# Patient Record
Sex: FEMALE | Race: WHITE | HISPANIC OR LATINO
[De-identification: names, ages, dates, MRNs, and addresses within clinical notes are randomized per-mention and may not be internally consistent; named-entity substitution may affect disease eponyms.]

---

## 2022-07-28 ENCOUNTER — RESULT REVIEW (OUTPATIENT)
Age: 62
End: 2022-07-28

## 2022-08-31 ENCOUNTER — APPOINTMENT (OUTPATIENT)
Dept: CT IMAGING | Facility: CLINIC | Age: 62
End: 2022-08-31

## 2022-08-31 ENCOUNTER — OUTPATIENT (OUTPATIENT)
Dept: OUTPATIENT SERVICES | Facility: HOSPITAL | Age: 62
LOS: 1 days | End: 2022-08-31
Payer: COMMERCIAL

## 2022-08-31 DIAGNOSIS — Z00.8 ENCOUNTER FOR OTHER GENERAL EXAMINATION: ICD-10-CM

## 2022-08-31 PROCEDURE — 70486 CT MAXILLOFACIAL W/O DYE: CPT | Mod: 26

## 2022-08-31 PROCEDURE — 73706 CT ANGIO LWR EXTR W/O&W/DYE: CPT

## 2022-08-31 PROCEDURE — 73706 CT ANGIO LWR EXTR W/O&W/DYE: CPT | Mod: 26,RT

## 2022-08-31 PROCEDURE — 70486 CT MAXILLOFACIAL W/O DYE: CPT

## 2022-08-31 PROCEDURE — 76706 US ABDL AORTA SCREEN AAA: CPT | Mod: 26,LT

## 2022-09-27 ENCOUNTER — APPOINTMENT (OUTPATIENT)
Dept: OTOLARYNGOLOGY | Facility: CLINIC | Age: 62
End: 2022-09-27

## 2022-09-27 VITALS
BODY MASS INDEX: 23.95 KG/M2 | OXYGEN SATURATION: 98 % | HEART RATE: 65 BPM | DIASTOLIC BLOOD PRESSURE: 86 MMHG | WEIGHT: 122 LBS | SYSTOLIC BLOOD PRESSURE: 135 MMHG | TEMPERATURE: 98 F | HEIGHT: 60 IN

## 2022-09-27 DIAGNOSIS — Z86.39 PERSONAL HISTORY OF OTHER ENDOCRINE, NUTRITIONAL AND METABOLIC DISEASE: ICD-10-CM

## 2022-09-27 DIAGNOSIS — Z78.9 OTHER SPECIFIED HEALTH STATUS: ICD-10-CM

## 2022-09-27 PROCEDURE — 99204 OFFICE O/P NEW MOD 45 MIN: CPT

## 2022-09-27 NOTE — PHYSICAL EXAM
[Midline] : trachea located in midline position [de-identified] : No palpable adenopathy [de-identified] : Good mouth opening and reasonable occlusion [de-identified] : Left-sided swelling in the mandible as noted in the HPI consistent with an expansile lesion of the mandible in the body of the ramus involving the posterior molar dentition consistent with the known diagnosis of ameloblastoma no loose dentition noted [Normal] : orientation to person, place, and time: normal [FreeTextEntry2] : Left mandibular asymmetry as noted in the HPI [de-identified] : No previous surgeries or skin changes or trauma to the lower extremities [de-identified] : Normal cranial nerve III exam bilaterally

## 2022-09-27 NOTE — DATA REVIEWED
[de-identified] : CT Head 8/31/22\par IMPRESSION:\par \par 3.7 cm expansile soft tissue lesion arising from the left mandibular angle with cortical thinning and dehiscence corresponding with history of ameloblastoma.\par -\par CT Lower Extremities  8/31/22\par IMPRESSION:\par \par Three-vessel runoff right lower extremity.\par 2 vessel runoff left lower extremity with anterior tibial occlusion in the hindfoot

## 2022-09-27 NOTE — HISTORY OF PRESENT ILLNESS
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  Patient was referred by Dr. Ortiz for planned fibular reconstruction of the left mandible with dental implant placement.  Dr. Ortiz plans a composite resection of the left mandible. [FreeTextEntry1] : Patient has mild swelling in the left face no numbness although the occasional sharp tingling shooting pain in the left side but otherwise has intact cranial nerve exam she does note swelling which is externally visible on the left body and ramus of the mandible

## 2022-12-19 ENCOUNTER — APPOINTMENT (OUTPATIENT)
Dept: OTOLARYNGOLOGY | Facility: HOSPITAL | Age: 62
End: 2022-12-19

## 2023-01-05 ENCOUNTER — NON-APPOINTMENT (OUTPATIENT)
Age: 63
End: 2023-01-05

## 2023-01-05 ENCOUNTER — APPOINTMENT (OUTPATIENT)
Dept: INTERNAL MEDICINE | Facility: CLINIC | Age: 63
End: 2023-01-05
Payer: COMMERCIAL

## 2023-01-05 VITALS
OXYGEN SATURATION: 97 % | WEIGHT: 118 LBS | TEMPERATURE: 99.3 F | DIASTOLIC BLOOD PRESSURE: 88 MMHG | SYSTOLIC BLOOD PRESSURE: 118 MMHG | BODY MASS INDEX: 23.05 KG/M2 | HEART RATE: 70 BPM

## 2023-01-05 DIAGNOSIS — Z81.8 FAMILY HISTORY OF OTHER MENTAL AND BEHAVIORAL DISORDERS: ICD-10-CM

## 2023-01-05 DIAGNOSIS — Z00.00 ENCOUNTER FOR GENERAL ADULT MEDICAL EXAMINATION W/OUT ABNORMAL FINDINGS: ICD-10-CM

## 2023-01-05 DIAGNOSIS — Z83.42 FAMILY HISTORY OF FAMILIAL HYPERCHOLESTEROLEMIA: ICD-10-CM

## 2023-01-05 DIAGNOSIS — Z82.49 FAMILY HISTORY OF ISCHEMIC HEART DISEASE AND OTHER DISEASES OF THE CIRCULATORY SYSTEM: ICD-10-CM

## 2023-01-05 DIAGNOSIS — Z23 ENCOUNTER FOR IMMUNIZATION: ICD-10-CM

## 2023-01-05 DIAGNOSIS — J32.9 CHRONIC SINUSITIS, UNSPECIFIED: ICD-10-CM

## 2023-01-05 PROCEDURE — 90686 IIV4 VACC NO PRSV 0.5 ML IM: CPT

## 2023-01-05 PROCEDURE — G0009: CPT | Mod: 59

## 2023-01-05 PROCEDURE — 90472 IMMUNIZATION ADMIN EACH ADD: CPT

## 2023-01-05 PROCEDURE — 90732 PPSV23 VACC 2 YRS+ SUBQ/IM: CPT

## 2023-01-05 PROCEDURE — 36415 COLL VENOUS BLD VENIPUNCTURE: CPT

## 2023-01-05 PROCEDURE — 93000 ELECTROCARDIOGRAM COMPLETE: CPT

## 2023-01-05 PROCEDURE — 99386 PREV VISIT NEW AGE 40-64: CPT | Mod: 25

## 2023-01-06 LAB
24R-OH-CALCIDIOL SERPL-MCNC: 56 PG/ML
ALBUMIN SERPL ELPH-MCNC: 4.7 G/DL
ALP BLD-CCNC: 47 U/L
ALT SERPL-CCNC: 21 U/L
ANION GAP SERPL CALC-SCNC: 12 MMOL/L
APPEARANCE: CLEAR
AST SERPL-CCNC: 20 U/L
BASOPHILS # BLD AUTO: 0.04 K/UL
BASOPHILS NFR BLD AUTO: 0.9 %
BILIRUB SERPL-MCNC: 0.7 MG/DL
BILIRUBIN URINE: NEGATIVE
BLOOD URINE: NEGATIVE
BUN SERPL-MCNC: 16 MG/DL
CALCIUM SERPL-MCNC: 9.8 MG/DL
CHLORIDE SERPL-SCNC: 107 MMOL/L
CHOLEST SERPL-MCNC: 242 MG/DL
CO2 SERPL-SCNC: 27 MMOL/L
COLOR: YELLOW
CREAT SERPL-MCNC: 0.76 MG/DL
EGFR: 90 ML/MIN/1.73M2
EOSINOPHIL # BLD AUTO: 0.07 K/UL
EOSINOPHIL NFR BLD AUTO: 1.5 %
ESTIMATED AVERAGE GLUCOSE: 123 MG/DL
GLUCOSE QUALITATIVE U: NEGATIVE
GLUCOSE SERPL-MCNC: 91 MG/DL
HBA1C MFR BLD HPLC: 5.9 %
HCT VFR BLD CALC: 38.1 %
HDLC SERPL-MCNC: 52 MG/DL
HGB BLD-MCNC: 12 G/DL
IMM GRANULOCYTES NFR BLD AUTO: 0.2 %
INR PPP: 1.03 RATIO
KETONES URINE: ABNORMAL
LDLC SERPL CALC-MCNC: 176 MG/DL
LEUKOCYTE ESTERASE URINE: NEGATIVE
LYMPHOCYTES # BLD AUTO: 2.14 K/UL
LYMPHOCYTES NFR BLD AUTO: 45.8 %
MAN DIFF?: NORMAL
MCHC RBC-ENTMCNC: 29.1 PG
MCHC RBC-ENTMCNC: 31.5 GM/DL
MCV RBC AUTO: 92.3 FL
MONOCYTES # BLD AUTO: 0.45 K/UL
MONOCYTES NFR BLD AUTO: 9.6 %
NEUTROPHILS # BLD AUTO: 1.96 K/UL
NEUTROPHILS NFR BLD AUTO: 42 %
NITRITE URINE: NEGATIVE
NONHDLC SERPL-MCNC: 190 MG/DL
PH URINE: 6
PLATELET # BLD AUTO: 389 K/UL
POTASSIUM SERPL-SCNC: 4.1 MMOL/L
PROT SERPL-MCNC: 7.2 G/DL
PROTEIN URINE: NORMAL
PT BLD: 12 SEC
RBC # BLD: 4.13 M/UL
RBC # FLD: 13.6 %
SODIUM SERPL-SCNC: 146 MMOL/L
SPECIFIC GRAVITY URINE: 1.03
TRIGL SERPL-MCNC: 72 MG/DL
TSH SERPL-ACNC: 0.57 UIU/ML
UROBILINOGEN URINE: NORMAL
WBC # FLD AUTO: 4.67 K/UL

## 2023-01-06 NOTE — ADDENDUM
[FreeTextEntry1] : EKG normal sinus rhythm, no ectopy–within normal limits,\par \par Blood work reviewed, normal values–within normal limits\par \par Physical examination within normal limits,\par \par Patient medically cleared for the surgery, no medical contraindications.  If any questions please feel free to contact [phone].\par

## 2023-01-06 NOTE — ASSESSMENT
[As per surgery] : as per surgery [FreeTextEntry5] : Not on any anticoagulants [FreeTextEntry6] : Not on any [FreeTextEntry7] : no medications

## 2023-01-06 NOTE — HISTORY OF PRESENT ILLNESS
[No Pertinent Cardiac History] : no history of aortic stenosis, atrial fibrillation, coronary artery disease, recent myocardial infarction, or implantable device/pacemaker [No Pertinent Pulmonary History] : no history of asthma, COPD, sleep apnea, or smoking [No Adverse Anesthesia Reaction] : no adverse anesthesia reaction in self or family member [(Patient denies any chest pain, claudication, dyspnea on exertion, orthopnea, palpitations or syncope)] : Patient denies any chest pain, claudication, dyspnea on exertion, orthopnea, palpitations or syncope [Anti-Platelet Agents: _____] : Anti-Platelet Agents: [unfilled] [Other: _____] : [unfilled] [FreeTextEntry1] : Segmental, Mandibulectomy, Nect dissection, Tracheostomy, fibular graft [FreeTextEntry2] : 01/11/23 [FreeTextEntry3] : Dr Randy Ortiz [FreeTextEntry4] : Patient here for medical clearance.62 year old female patient presents for left jaw surgery for amyloblastoma reccurent.offers no complaints\par  Pt feels better at the time of exam, no fever , shaking chills no nausea, no vomiting, no diarrhoea, no constipation, no chest pain, no palpitations.Denies any cough or any other respiratory s/s.Not loosing lot of weight nor gaining excess amount of weight.No muscle aches, no joint pains, no skin rashes.No forgetfulness no difficulty with ADLs, Able to sleep, eat ,function and eliminate well.Not been to an emergency room or hospital in the last one year. vaccinated for covid with both vaccinationsjhonson and juma inetial with booster.offers no complaints.H/o covid infection dec2021.\par

## 2023-01-06 NOTE — PLAN
[FreeTextEntry1] : EKG normal sinus rhythm, no ectopy–within normal limits,\par \par Blood work drawn\par Physical examination within normal limits,\par \par Patient medically cleared for the surgery, no medical contraindications.  If any questions please feel free to contact [phone].\par

## 2023-01-10 ENCOUNTER — TRANSCRIPTION ENCOUNTER (OUTPATIENT)
Age: 63
End: 2023-01-10

## 2023-01-10 NOTE — PATIENT PROFILE ADULT - FALL HARM RISK - UNIVERSAL INTERVENTIONS
Bed in lowest position, wheels locked, appropriate side rails in place/Call bell, personal items and telephone in reach/Instruct patient to call for assistance before getting out of bed or chair/Non-slip footwear when patient is out of bed/[place] to call system/Physically safe environment - no spills, clutter or unnecessary equipment/Purposeful Proactive Rounding/Room/bathroom lighting operational, light cord in reach

## 2023-01-11 ENCOUNTER — TRANSCRIPTION ENCOUNTER (OUTPATIENT)
Age: 63
End: 2023-01-11

## 2023-01-11 ENCOUNTER — INPATIENT (INPATIENT)
Facility: HOSPITAL | Age: 63
LOS: 6 days | Discharge: HOME | DRG: 141 | End: 2023-01-18
Attending: DENTIST | Admitting: DENTIST
Payer: COMMERCIAL

## 2023-01-11 ENCOUNTER — APPOINTMENT (OUTPATIENT)
Dept: OTOLARYNGOLOGY | Facility: HOSPITAL | Age: 63
End: 2023-01-11

## 2023-01-11 VITALS
SYSTOLIC BLOOD PRESSURE: 169 MMHG | WEIGHT: 119.27 LBS | RESPIRATION RATE: 16 BRPM | OXYGEN SATURATION: 100 % | DIASTOLIC BLOOD PRESSURE: 90 MMHG | HEART RATE: 101 BPM | HEIGHT: 60 IN | TEMPERATURE: 98 F

## 2023-01-11 DIAGNOSIS — K06.9 DISORDER OF GINGIVA AND EDENTULOUS ALVEOLAR RIDGE, UNSPECIFIED: Chronic | ICD-10-CM

## 2023-01-11 DIAGNOSIS — Z87.42 PERSONAL HISTORY OF OTHER DISEASES OF THE FEMALE GENITAL TRACT: Chronic | ICD-10-CM

## 2023-01-11 LAB
ANION GAP SERPL CALC-SCNC: 8 MMOL/L — SIGNIFICANT CHANGE UP (ref 5–17)
BUN SERPL-MCNC: 9 MG/DL — SIGNIFICANT CHANGE UP (ref 7–23)
CALCIUM SERPL-MCNC: 8.2 MG/DL — LOW (ref 8.4–10.5)
CHLORIDE SERPL-SCNC: 104 MMOL/L — SIGNIFICANT CHANGE UP (ref 96–108)
CO2 SERPL-SCNC: 25 MMOL/L — SIGNIFICANT CHANGE UP (ref 22–31)
CREAT SERPL-MCNC: 0.65 MG/DL — SIGNIFICANT CHANGE UP (ref 0.5–1.3)
EGFR: 101 ML/MIN/1.73M2 — SIGNIFICANT CHANGE UP
GLUCOSE BLDC GLUCOMTR-MCNC: 157 MG/DL — HIGH (ref 70–99)
GLUCOSE SERPL-MCNC: 202 MG/DL — HIGH (ref 70–99)
HCT VFR BLD CALC: 29.8 % — LOW (ref 34.5–45)
HGB BLD-MCNC: 9.7 G/DL — LOW (ref 11.5–15.5)
MAGNESIUM SERPL-MCNC: 1.5 MG/DL — LOW (ref 1.6–2.6)
MCHC RBC-ENTMCNC: 29.4 PG — SIGNIFICANT CHANGE UP (ref 27–34)
MCHC RBC-ENTMCNC: 32.6 GM/DL — SIGNIFICANT CHANGE UP (ref 32–36)
MCV RBC AUTO: 90.3 FL — SIGNIFICANT CHANGE UP (ref 80–100)
NRBC # BLD: 0 /100 WBCS — SIGNIFICANT CHANGE UP (ref 0–0)
PHOSPHATE SERPL-MCNC: 3.1 MG/DL — SIGNIFICANT CHANGE UP (ref 2.5–4.5)
PLATELET # BLD AUTO: 314 K/UL — SIGNIFICANT CHANGE UP (ref 150–400)
POTASSIUM SERPL-MCNC: 3.8 MMOL/L — SIGNIFICANT CHANGE UP (ref 3.5–5.3)
POTASSIUM SERPL-SCNC: 3.8 MMOL/L — SIGNIFICANT CHANGE UP (ref 3.5–5.3)
RBC # BLD: 3.3 M/UL — LOW (ref 3.8–5.2)
RBC # FLD: 13.2 % — SIGNIFICANT CHANGE UP (ref 10.3–14.5)
SODIUM SERPL-SCNC: 137 MMOL/L — SIGNIFICANT CHANGE UP (ref 135–145)
TSH SERPL-MCNC: 0.57 UIU/ML — SIGNIFICANT CHANGE UP (ref 0.27–4.2)
WBC # BLD: 8.09 K/UL — SIGNIFICANT CHANGE UP (ref 3.8–10.5)
WBC # FLD AUTO: 8.09 K/UL — SIGNIFICANT CHANGE UP (ref 3.8–10.5)

## 2023-01-11 PROCEDURE — 88309 TISSUE EXAM BY PATHOLOGIST: CPT | Mod: 26

## 2023-01-11 PROCEDURE — 20955 FIBULA BONE GRAFT MICROVASC: CPT

## 2023-01-11 PROCEDURE — 99221 1ST HOSP IP/OBS SF/LOW 40: CPT

## 2023-01-11 PROCEDURE — 21244 RECONSTRUCTION OF LOWER JAW: CPT | Mod: 62

## 2023-01-11 PROCEDURE — 88311 DECALCIFY TISSUE: CPT | Mod: 26

## 2023-01-11 PROCEDURE — 71045 X-RAY EXAM CHEST 1 VIEW: CPT | Mod: 26

## 2023-01-11 DEVICE — LIGATING CLIPS SYNOVIS SUPERFINE MICROCLIP 6: Type: IMPLANTABLE DEVICE | Status: FUNCTIONAL

## 2023-01-11 DEVICE — DOPPLER PROBE DISPOSABLE: Type: IMPLANTABLE DEVICE | Status: FUNCTIONAL

## 2023-01-11 DEVICE — SCREW BONE 2.0 X 8MM CROSS PIN: Type: IMPLANTABLE DEVICE | Status: FUNCTIONAL

## 2023-01-11 DEVICE — CLIP APPLIER ETHICON LIGACLIP 9 3/8" SMALL: Type: IMPLANTABLE DEVICE | Status: FUNCTIONAL

## 2023-01-11 DEVICE — IMP SCREW RET ABUT ST GH 4.6X2.5MM TAN: Type: IMPLANTABLE DEVICE | Status: FUNCTIONAL

## 2023-01-11 DEVICE — IMPLANTABLE DEVICE: Type: IMPLANTABLE DEVICE | Status: FUNCTIONAL

## 2023-01-11 DEVICE — SCREW LOKG 2.0X8MM MUST ORDER IN MULT OF 5 EA: Type: IMPLANTABLE DEVICE | Status: FUNCTIONAL

## 2023-01-11 DEVICE — PLATE CUSTOM MAND RECON HEMI: Type: IMPLANTABLE DEVICE | Status: FUNCTIONAL

## 2023-01-11 DEVICE — SCREW 2.0 X 12MM BONE CROSS PIN: Type: IMPLANTABLE DEVICE | Status: FUNCTIONAL

## 2023-01-11 DEVICE — SCREW BONE 2.0 X 6MM: Type: IMPLANTABLE DEVICE | Status: FUNCTIONAL

## 2023-01-11 DEVICE — CLIP APPLIER ETHICON LIGACLIP 11.5" MEDIUM: Type: IMPLANTABLE DEVICE | Status: FUNCTIONAL

## 2023-01-11 DEVICE — SCREW MMF LOCKING 2.0X12MM: Type: IMPLANTABLE DEVICE | Status: FUNCTIONAL

## 2023-01-11 DEVICE — SCREW LOKG 2.0X6MM: Type: IMPLANTABLE DEVICE | Status: FUNCTIONAL

## 2023-01-11 DEVICE — SCREW SLF DRILLING 8MM MMF: Type: IMPLANTABLE DEVICE | Status: FUNCTIONAL

## 2023-01-11 DEVICE — GRAFT NERVE PROTECTOR 5X40MM: Type: IMPLANTABLE DEVICE | Status: FUNCTIONAL

## 2023-01-11 DEVICE — BUNDLE VSP RECON GUIDES TI: Type: IMPLANTABLE DEVICE | Status: FUNCTIONAL

## 2023-01-11 DEVICE — SURGIFOAM PAD 8CM X 12.5CM X 10MM (100): Type: IMPLANTABLE DEVICE | Status: FUNCTIONAL

## 2023-01-11 DEVICE — CARTRIDGE MICROCLIP 30: Type: IMPLANTABLE DEVICE | Status: FUNCTIONAL

## 2023-01-11 DEVICE — COUPLER VESSEL ANASTOMOTIC 3MM: Type: IMPLANTABLE DEVICE | Status: FUNCTIONAL

## 2023-01-11 RX ORDER — AMPICILLIN SODIUM AND SULBACTAM SODIUM 250; 125 MG/ML; MG/ML
3 INJECTION, POWDER, FOR SUSPENSION INTRAMUSCULAR; INTRAVENOUS EVERY 6 HOURS
Refills: 0 | Status: COMPLETED | OUTPATIENT
Start: 2023-01-11 | End: 2023-01-12

## 2023-01-11 RX ORDER — OXYCODONE HYDROCHLORIDE 5 MG/1
5 TABLET ORAL
Refills: 0 | Status: DISCONTINUED | OUTPATIENT
Start: 2023-01-11 | End: 2023-01-12

## 2023-01-11 RX ORDER — POTASSIUM CHLORIDE 20 MEQ
10 PACKET (EA) ORAL
Refills: 0 | Status: COMPLETED | OUTPATIENT
Start: 2023-01-11 | End: 2023-01-11

## 2023-01-11 RX ORDER — DEXTROSE 50 % IN WATER 50 %
12.5 SYRINGE (ML) INTRAVENOUS ONCE
Refills: 0 | Status: DISCONTINUED | OUTPATIENT
Start: 2023-01-11 | End: 2023-01-18

## 2023-01-11 RX ORDER — DEXTROSE 50 % IN WATER 50 %
15 SYRINGE (ML) INTRAVENOUS ONCE
Refills: 0 | Status: DISCONTINUED | OUTPATIENT
Start: 2023-01-11 | End: 2023-01-18

## 2023-01-11 RX ORDER — INSULIN LISPRO 100/ML
VIAL (ML) SUBCUTANEOUS EVERY 6 HOURS
Refills: 0 | Status: DISCONTINUED | OUTPATIENT
Start: 2023-01-11 | End: 2023-01-18

## 2023-01-11 RX ORDER — DEXTROSE 50 % IN WATER 50 %
25 SYRINGE (ML) INTRAVENOUS ONCE
Refills: 0 | Status: DISCONTINUED | OUTPATIENT
Start: 2023-01-11 | End: 2023-01-18

## 2023-01-11 RX ORDER — CHLORHEXIDINE GLUCONATE 213 G/1000ML
15 SOLUTION TOPICAL
Refills: 0 | Status: DISCONTINUED | OUTPATIENT
Start: 2023-01-11 | End: 2023-01-18

## 2023-01-11 RX ORDER — HYDROMORPHONE HYDROCHLORIDE 2 MG/ML
0.25 INJECTION INTRAMUSCULAR; INTRAVENOUS; SUBCUTANEOUS EVERY 4 HOURS
Refills: 0 | Status: DISCONTINUED | OUTPATIENT
Start: 2023-01-11 | End: 2023-01-11

## 2023-01-11 RX ORDER — SODIUM CHLORIDE 9 MG/ML
1000 INJECTION, SOLUTION INTRAVENOUS
Refills: 0 | Status: DISCONTINUED | OUTPATIENT
Start: 2023-01-11 | End: 2023-01-18

## 2023-01-11 RX ORDER — ACETAMINOPHEN 500 MG
810 TABLET ORAL EVERY 6 HOURS
Refills: 0 | Status: DISCONTINUED | OUTPATIENT
Start: 2023-01-11 | End: 2023-01-18

## 2023-01-11 RX ORDER — PANTOPRAZOLE SODIUM 20 MG/1
40 TABLET, DELAYED RELEASE ORAL DAILY
Refills: 0 | Status: DISCONTINUED | OUTPATIENT
Start: 2023-01-11 | End: 2023-01-18

## 2023-01-11 RX ORDER — SODIUM CHLORIDE 9 MG/ML
1000 INJECTION, SOLUTION INTRAVENOUS
Refills: 0 | Status: DISCONTINUED | OUTPATIENT
Start: 2023-01-11 | End: 2023-01-12

## 2023-01-11 RX ORDER — GABAPENTIN 400 MG/1
300 CAPSULE ORAL
Refills: 0 | Status: DISCONTINUED | OUTPATIENT
Start: 2023-01-11 | End: 2023-01-18

## 2023-01-11 RX ORDER — MAGNESIUM SULFATE 500 MG/ML
2 VIAL (ML) INJECTION ONCE
Refills: 0 | Status: COMPLETED | OUTPATIENT
Start: 2023-01-11 | End: 2023-01-11

## 2023-01-11 RX ORDER — DEXAMETHASONE 0.5 MG/5ML
8 ELIXIR ORAL EVERY 8 HOURS
Refills: 0 | Status: DISCONTINUED | OUTPATIENT
Start: 2023-01-11 | End: 2023-01-12

## 2023-01-11 RX ORDER — POLYETHYLENE GLYCOL 3350 17 G/17G
17 POWDER, FOR SOLUTION ORAL DAILY
Refills: 0 | Status: DISCONTINUED | OUTPATIENT
Start: 2023-01-11 | End: 2023-01-18

## 2023-01-11 RX ORDER — GLUCAGON INJECTION, SOLUTION 0.5 MG/.1ML
1 INJECTION, SOLUTION SUBCUTANEOUS ONCE
Refills: 0 | Status: DISCONTINUED | OUTPATIENT
Start: 2023-01-11 | End: 2023-01-18

## 2023-01-11 RX ORDER — SENNA PLUS 8.6 MG/1
2 TABLET ORAL AT BEDTIME
Refills: 0 | Status: DISCONTINUED | OUTPATIENT
Start: 2023-01-11 | End: 2023-01-18

## 2023-01-11 RX ORDER — METRONIDAZOLE 500 MG
500 TABLET ORAL EVERY 8 HOURS
Refills: 0 | Status: COMPLETED | OUTPATIENT
Start: 2023-01-11 | End: 2023-01-12

## 2023-01-11 RX ORDER — HYDROMORPHONE HYDROCHLORIDE 2 MG/ML
0.5 INJECTION INTRAMUSCULAR; INTRAVENOUS; SUBCUTANEOUS ONCE
Refills: 0 | Status: DISCONTINUED | OUTPATIENT
Start: 2023-01-11 | End: 2023-01-12

## 2023-01-11 RX ADMIN — SENNA PLUS 2 TABLET(S): 8.6 TABLET ORAL at 22:46

## 2023-01-11 RX ADMIN — HYDROMORPHONE HYDROCHLORIDE 0.25 MILLIGRAM(S): 2 INJECTION INTRAMUSCULAR; INTRAVENOUS; SUBCUTANEOUS at 18:00

## 2023-01-11 RX ADMIN — Medication 100 MILLIEQUIVALENT(S): at 22:03

## 2023-01-11 RX ADMIN — Medication 8 MILLIGRAM(S): at 18:32

## 2023-01-11 RX ADMIN — PANTOPRAZOLE SODIUM 40 MILLIGRAM(S): 20 TABLET, DELAYED RELEASE ORAL at 19:08

## 2023-01-11 RX ADMIN — HYDROMORPHONE HYDROCHLORIDE 0.25 MILLIGRAM(S): 2 INJECTION INTRAMUSCULAR; INTRAVENOUS; SUBCUTANEOUS at 17:14

## 2023-01-11 RX ADMIN — Medication 100 MILLIGRAM(S): at 19:15

## 2023-01-11 RX ADMIN — CHLORHEXIDINE GLUCONATE 15 MILLILITER(S): 213 SOLUTION TOPICAL at 23:17

## 2023-01-11 RX ADMIN — Medication 810 MILLIGRAM(S): at 23:14

## 2023-01-11 RX ADMIN — Medication 25 GRAM(S): at 19:07

## 2023-01-11 RX ADMIN — AMPICILLIN SODIUM AND SULBACTAM SODIUM 200 GRAM(S): 250; 125 INJECTION, POWDER, FOR SUSPENSION INTRAMUSCULAR; INTRAVENOUS at 20:56

## 2023-01-11 RX ADMIN — Medication 100 MILLIEQUIVALENT(S): at 20:55

## 2023-01-11 RX ADMIN — SODIUM CHLORIDE 90 MILLILITER(S): 9 INJECTION, SOLUTION INTRAVENOUS at 19:54

## 2023-01-11 NOTE — CONSULT NOTE ADULT - ASSESSMENT
Neuro: Tylenol ATC, Gabapentin 600mg QD, PRN Dilaudid    HEENT: Flap checks q1h, Head neutral, Chlorhexidine PO BID, Bacitracin Ointment. Decadron 8q8 x3  CV: HD stable, Maintain MAP > 60, Low dose Levo gtt if needed  Pulm: on NC  GI: NPO, LR 90, DHT CXR to confirm placement; PPI, Senna, Miralax, PRN Zofran   : [place] [employer] I&Os  ID: Mary-op Abx x24 hrs- Unasyn ( 1/11-) Flagyl(1/11-)   Endo: mISS on decadron 8q8 x3  Heme: Active T&S, Hgb >8   ppx: SCDs, SQL on POD#1  Lines: ZOE Blanca(1/11-)   Wounds: MOSES x in leg   PT/OT: Not Ordered     61 y/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     Neuro: Pain: Tylenol ATC, Gabapentin 600mg, Oxycodone PRN via DHT. PRN Dilaudid IV.   HEENT: Flap checks q1h, Head neutral, Chlorhexidine PO BID, Bacitracin Ointmen.  CV: HD stable, Maintain MAP > 60, Low dose Levo gtt if needed  Pulm: on NC  GI: NPO, LR 90, DHT CXR to confirm placement; PPI, Senna, Miralax, PRN Zofran   : [place] I&Os  ID: Mary-op Abx x24 hrs- Unasyn ( 1/11-) Flagyl(1/11-)   Endo: mISS on decadron 8q8 x3  Heme: Active T&S, Hgb >8   ppx: SCDs, SQL on POD#1  Lines: PIV Rianna(1/11-)   Wounds: MOSES x in leg   PT/OT: Not Ordered

## 2023-01-11 NOTE — H&P ADULT - NSHPPHYSICALEXAM_GEN_ALL_CORE
Constitutional: normal appearance, well groomed, well nourished, and in no acute distress.  Neck: no mass and no adenopathy . No palpable adenopathy. parotid gland, submandibular gland and thyroid glands are normal. temporomandibular joint is normal. Temporomandibular joint: Good mouth opening and reasonable occlusion.  Ear: external ears are normal bilaterally.  Nasal:  external appearance is normal, inferior turbinates and middle turbinates are normal and no abnormal secretions.  Oral Cavity: tongue is normal, teeth are normal, palatine tonsils are normal, lingual tonsils are normal, mucosa is normal and trachea located in midline position. Left-sided swelling in the mandible as noted in the HPI consistent with an expansile lesion of the mandible in the body of the ramus involving the posterior molar dentition consistent with the known diagnosis of ameloblastoma no loose dentition noted.  Head/Face:. Left mandibular asymmetry as noted in the HPI. salivary glands are normal  Eyes: ocular motility and gaze are normal, extraocular movements are normal and no nystagmus.  Cardiovascular: cardiovascular peripheral examination is normal. Examination of Periph. Vascular System: No previous surgeries or skin changes or trauma to the lower extremities.  Lymphatic: palpation of lymph nodes is normal and no neck adenopathy.  Neurological: cranial nerves 2-12 intact and orientation to person, place, and time: normal. Neuro: cranial nerves 2-12: Normal cranial nerve III exam bilaterally.

## 2023-01-11 NOTE — H&P ADULT - NSICDXPASTMEDICALHX_GEN_ALL_CORE_FT
PAST MEDICAL HISTORY:  Ameloblastoma of mandible     GERD (Gastroesophageal Reflux Disease) (ICD9 530.81)

## 2023-01-11 NOTE — CONSULT NOTE ADULT - SUBJECTIVE AND OBJECTIVE BOX
SICU CONSULT NOTE    HPI:  62F presents with recent diagnosis of left posterior mandible ameloblastoma presenting today for Left mandibular composite resection, exploration of left neck for microvascular anastomosis, reconstruction with left Fibula free flap, placement of dental implants, possible split thickness skin graft, possible trach. Patient has mild swelling in the left face no numbness although the occasional sharp tingling shooting pain in the left side but otherwise has intact cranial nerve exam she does note swelling which is externally visible on the left body and ramus of the mandible.    Pmhx: HLD, GERD (11 Jan 2023 07:31)      SICU ADDENDUM:     PAST MEDICAL & SURGICAL HISTORY:  GERD (Gastroesophageal Reflux Disease) (ICD9 530.81)  Ameloblastoma of mandible  H/O: Myomectomy (ICD9 V45.89)  S/P Tonsillectomy (ICD9 V45.89)  History of ovarian cyst b/l  Disorder of gums (removal x 10 years)      REVIEW OF SYSTEMS:   Pertinent positives/negatives noted in HPI.       HOME MEDICATIONS:  Home Medications:      ALLERGIES:  - No Known Drug Allergies  - Nuts (Anaphylaxis)    Intolerances:  None      SOCIAL HISTORY:     FAMILY HISTORY:      Vital Signs Last 24 Hrs  T(C): 36.8 (11 Jan 2023 16:31), Max: 36.8 (11 Jan 2023 16:31)  T(F): 98.2 (11 Jan 2023 16:31), Max: 98.2 (11 Jan 2023 16:31)  HR: 73 (11 Jan 2023 18:30) (63 - 101)  BP: 125/71 (11 Jan 2023 18:30) (118/73 - 169/90)  BP(mean): 93 (11 Jan 2023 18:30) (88 - 94)  RR: 14 (11 Jan 2023 18:30) (10 - 23)  SpO2: 100% (11 Jan 2023 18:30) (100% - 100%)    Parameters below as of 11 Jan 2023 18:30  Patient On (Oxygen Delivery Method): nasal cannula  O2 Flow (L/min): 3      PHYSICAL EXAM:  T(C): 36.8 (01-11-23 @ 16:31), Max: 36.8 (01-11-23 @ 16:31)  HR: 73 (01-11-23 @ 18:30) (63 - 101)  BP: 125/71 (01-11-23 @ 18:30) (118/73 - 169/90)  RR: 14 (01-11-23 @ 18:30) (10 - 23)  SpO2: 100% (01-11-23 @ 18:30) (100% - 100%)    GENERAL: NAD, Resting comfortably in bed, asleep  HEENT: NCAT, MMM, Normal conjunctiva  RESP: Nonlabored breathing, No respiratory distress  CARD: Normal rate, Normal peripheral perfusion  GI: Soft, ND, NT, No guarding, No rebound tenderness  EXTREM: WWP, No edema, No gross deformity of extremities  SKIN: No rashes, no lesions  NEURO: AAOx3, No focal motor or sensory deficits  PSYCH: Affect and characteristics of appearance, verbalizations, and behaviors are appropriate    LABS:                        9.7    8.09  )-----------( 314      ( 11 Jan 2023 16:47 )             29.8     01-11    137  |  104  |  9   ----------------------------<  202<H>  3.8   |  25  |  0.65    Ca    8.2<L>      11 Jan 2023 16:47  Phos  3.1     01-11  Mg     1.5     01-11     SICU CONSULT NOTE    HPI:  62F presents with recent diagnosis of left posterior mandible ameloblastoma presenting today for Left mandibular composite resection, exploration of left neck for microvascular anastomosis, reconstruction with left Fibula free flap, placement of dental implants, possible split thickness skin graft, possible trach. Patient has mild swelling in the left face no numbness although the occasional sharp tingling shooting pain in the left side but otherwise has intact cranial nerve exam she does note swelling which is externally visible on the left body and ramus of the mandible.    Pmhx: HLD, GERD (11 Jan 2023 07:31)    SICU ADDENDUM:   now s/p L segmental mandibulectomy L FFF, L neck exploration, MOSES leg, neck penrose, cook doppler, 2 implants, rubber bands, DHT. , UOP 1100, 3200 LR. OR uncomplicated and transferred to SICU for airway monitoring and flap checks.    PAST MEDICAL & SURGICAL HISTORY:  GERD (Gastroesophageal Reflux Disease) (ICD9 530.81)  Ameloblastoma of mandible  H/O: Myomectomy (ICD9 V45.89)  S/P Tonsillectomy (ICD9 V45.89)  History of ovarian cyst b/l  Disorder of gums (removal x 10 years)    REVIEW OF SYSTEMS:   Pertinent positives/negatives noted in HPI.     HOME MEDICATIONS:  Home Medications:    ALLERGIES:  - No Known Drug Allergies  - Nuts (Anaphylaxis)    Intolerances:  None    SOCIAL HISTORY:     FAMILY HISTORY:    Vital Signs Last 24 Hrs  T(C): 36.8 (11 Jan 2023 16:31), Max: 36.8 (11 Jan 2023 16:31)  T(F): 98.2 (11 Jan 2023 16:31), Max: 98.2 (11 Jan 2023 16:31)  HR: 73 (11 Jan 2023 18:30) (63 - 101)  BP: 125/71 (11 Jan 2023 18:30) (118/73 - 169/90)  BP(mean): 93 (11 Jan 2023 18:30) (88 - 94)  RR: 14 (11 Jan 2023 18:30) (10 - 23)  SpO2: 100% (11 Jan 2023 18:30) (100% - 100%)    Parameters below as of 11 Jan 2023 18:30  Patient On (Oxygen Delivery Method): nasal cannula  O2 Flow (L/min): 3    PHYSICAL EXAM:  T(C): 36.8 (01-11-23 @ 16:31), Max: 36.8 (01-11-23 @ 16:31)  HR: 73 (01-11-23 @ 18:30) (63 - 101)  BP: 125/71 (01-11-23 @ 18:30) (118/73 - 169/90)  RR: 14 (01-11-23 @ 18:30) (10 - 23)  SpO2: 100% (01-11-23 @ 18:30) (100% - 100%)    GENERAL: NAD, AXOx3, follows commands  HEENT: NCAT, MMM, incisions C/D/I, penrose site covered by gauze.   RESP: Nonlabored breathing, No respiratory distress, on NC, lungs CTA b/l  CARD: Normal rate, Normal peripheral perfusion, no murmurs  GI: Soft, ND, NT  EXTREM: WWP, No edema, No gross deformity of extremities, MOSES L thigh SS output, incision CDI  SKIN: No rashes, no lesions  NEURO: No focal motor or sensory deficits    LABS:                        9.7    8.09  )-----------( 314      ( 11 Jan 2023 16:47 )             29.8     01-11    137  |  104  |  9   ----------------------------<  202<H>  3.8   |  25  |  0.65    Ca    8.2<L>      11 Jan 2023 16:47  Phos  3.1     01-11  Mg     1.5     01-11

## 2023-01-11 NOTE — PRE-ANESTHESIA EVALUATION ADULT - NSPROPOSEDPROCEDFT_GEN_ALL_CORE
left segmental mandibulectomy/left fibula free flap graft/insertion splint/insertion endosteal implants

## 2023-01-11 NOTE — H&P ADULT - HISTORY OF PRESENT ILLNESS
62F presents with recent diagnosis of left posterior mandible ameloblastoma presenting today for Left mandibular composite resection, exploration of left neck for microvascular anastomosis, reconstruction with left Fibula free flap, placement of dental implants, possible split thickness skin graft, possible trach. Patient has mild swelling in the left face no numbness although the occasional sharp tingling shooting pain in the left side but otherwise has intact cranial nerve exam she does note swelling which is externally visible on the left body and ramus of the mandible.    Pmhx: HLD, GERD

## 2023-01-11 NOTE — H&P ADULT - NSICDXPASTSURGICALHX_GEN_ALL_CORE_FT
PAST SURGICAL HISTORY:  Disorder of gums removal x 10 years    H/O: Myomectomy (ICD9 V45.89)     History of ovarian cyst b/l    S/P Tonsillectomy (ICD9 V45.89)

## 2023-01-11 NOTE — PRE-ANESTHESIA EVALUATION ADULT - NSANTHASARD_GEN_ALL_CORE
2 O-T Advancement Flap Text: The defect edges were debeveled with a #15 scalpel blade.  Given the location of the defect, shape of the defect and the proximity to free margins an O-T advancement flap was deemed most appropriate.  Using a sterile surgical marker, an appropriate advancement flap was drawn incorporating the defect and placing the expected incisions within the relaxed skin tension lines where possible.    The area thus outlined was incised deep to adipose tissue with a #15 scalpel blade.  The skin margins were undermined to an appropriate distance in all directions utilizing iris scissors.

## 2023-01-11 NOTE — H&P ADULT - ASSESSMENT
62F presents with recent diagnosis of left posterior mandible ameloblastoma presenting today for Left mandibular composite resection, exploration of left neck for microvascular anastomosis, reconstruction with left Fibula free flap, placement of dental implants, possible split thickness skin graft, possible trach.  - Consent signed.   - Post operative SICU admission for HD monitoring and flap checks  - Pain control 62F presents with recent diagnosis of left posterior mandible ameloblastoma presenting today for Left mandibular composite resection, exploration of left neck for microvascular anastomosis, reconstruction with left Fibula free flap, placement of dental implants 1/11.  - Pain meds (Tylenol 975mg q6 hrs), opioids as needed  - decadron 8 q8 x3 doses  - Post op CBC, BMP, TSH, A1C  - Start feeds in AM if NGt in place (check CXR)  - RN Flap checks Q1 x24hrs  - Unasyn  x24h  - Lovenox starting tomorrow  - Dc nieto tomorrow  - Zofran 4mg IV q8 PRN for nausea/vomiting  - Agitation meds as needed to avoid excessive neck movement  - NOTHING around neck (no trach ties or collars), do not cut trach sutures  - Peridex swish and spit BID  - PPI  - Monitor BP, ok for pressors to maintain MAP>60   - Bowel regimen (senna + miralax)

## 2023-01-12 LAB
A1C WITH ESTIMATED AVERAGE GLUCOSE RESULT: 5.6 % — SIGNIFICANT CHANGE UP (ref 4–5.6)
ANION GAP SERPL CALC-SCNC: 5 MMOL/L — SIGNIFICANT CHANGE UP (ref 5–17)
BUN SERPL-MCNC: 7 MG/DL — SIGNIFICANT CHANGE UP (ref 7–23)
CALCIUM SERPL-MCNC: 8.2 MG/DL — LOW (ref 8.4–10.5)
CHLORIDE SERPL-SCNC: 106 MMOL/L — SIGNIFICANT CHANGE UP (ref 96–108)
CO2 SERPL-SCNC: 26 MMOL/L — SIGNIFICANT CHANGE UP (ref 22–31)
CREAT SERPL-MCNC: 0.65 MG/DL — SIGNIFICANT CHANGE UP (ref 0.5–1.3)
EGFR: 101 ML/MIN/1.73M2 — SIGNIFICANT CHANGE UP
ESTIMATED AVERAGE GLUCOSE: 114 MG/DL — SIGNIFICANT CHANGE UP (ref 68–114)
GLUCOSE BLDC GLUCOMTR-MCNC: 154 MG/DL — HIGH (ref 70–99)
GLUCOSE BLDC GLUCOMTR-MCNC: 160 MG/DL — HIGH (ref 70–99)
GLUCOSE BLDC GLUCOMTR-MCNC: 161 MG/DL — HIGH (ref 70–99)
GLUCOSE BLDC GLUCOMTR-MCNC: 161 MG/DL — HIGH (ref 70–99)
GLUCOSE BLDC GLUCOMTR-MCNC: 166 MG/DL — HIGH (ref 70–99)
GLUCOSE SERPL-MCNC: 174 MG/DL — HIGH (ref 70–99)
HCT VFR BLD CALC: 29.2 % — LOW (ref 34.5–45)
HGB BLD-MCNC: 9.6 G/DL — LOW (ref 11.5–15.5)
MAGNESIUM SERPL-MCNC: 2.2 MG/DL — SIGNIFICANT CHANGE UP (ref 1.6–2.6)
MCHC RBC-ENTMCNC: 29.4 PG — SIGNIFICANT CHANGE UP (ref 27–34)
MCHC RBC-ENTMCNC: 32.9 GM/DL — SIGNIFICANT CHANGE UP (ref 32–36)
MCV RBC AUTO: 89.6 FL — SIGNIFICANT CHANGE UP (ref 80–100)
NRBC # BLD: 0 /100 WBCS — SIGNIFICANT CHANGE UP (ref 0–0)
PHOSPHATE SERPL-MCNC: 2.6 MG/DL — SIGNIFICANT CHANGE UP (ref 2.5–4.5)
PLATELET # BLD AUTO: 329 K/UL — SIGNIFICANT CHANGE UP (ref 150–400)
POTASSIUM SERPL-MCNC: 4.1 MMOL/L — SIGNIFICANT CHANGE UP (ref 3.5–5.3)
POTASSIUM SERPL-SCNC: 4.1 MMOL/L — SIGNIFICANT CHANGE UP (ref 3.5–5.3)
RBC # BLD: 3.26 M/UL — LOW (ref 3.8–5.2)
RBC # FLD: 13.4 % — SIGNIFICANT CHANGE UP (ref 10.3–14.5)
SODIUM SERPL-SCNC: 137 MMOL/L — SIGNIFICANT CHANGE UP (ref 135–145)
WBC # BLD: 9.16 K/UL — SIGNIFICANT CHANGE UP (ref 3.8–10.5)
WBC # FLD AUTO: 9.16 K/UL — SIGNIFICANT CHANGE UP (ref 3.8–10.5)

## 2023-01-12 PROCEDURE — 99232 SBSQ HOSP IP/OBS MODERATE 35: CPT | Mod: GC

## 2023-01-12 RX ORDER — BACITRACIN ZINC 500 UNIT/G
1 OINTMENT IN PACKET (EA) TOPICAL EVERY 12 HOURS
Refills: 0 | Status: DISCONTINUED | OUTPATIENT
Start: 2023-01-12 | End: 2023-01-18

## 2023-01-12 RX ORDER — SODIUM,POTASSIUM PHOSPHATES 278-250MG
1 POWDER IN PACKET (EA) ORAL ONCE
Refills: 0 | Status: COMPLETED | OUTPATIENT
Start: 2023-01-12 | End: 2023-01-12

## 2023-01-12 RX ORDER — ENOXAPARIN SODIUM 100 MG/ML
40 INJECTION SUBCUTANEOUS EVERY 24 HOURS
Refills: 0 | Status: DISCONTINUED | OUTPATIENT
Start: 2023-01-12 | End: 2023-01-16

## 2023-01-12 RX ORDER — DEXAMETHASONE 0.5 MG/5ML
8 ELIXIR ORAL ONCE
Refills: 0 | Status: COMPLETED | OUTPATIENT
Start: 2023-01-12 | End: 2023-01-12

## 2023-01-12 RX ADMIN — GABAPENTIN 300 MILLIGRAM(S): 400 CAPSULE ORAL at 18:33

## 2023-01-12 RX ADMIN — CHLORHEXIDINE GLUCONATE 15 MILLILITER(S): 213 SOLUTION TOPICAL at 17:26

## 2023-01-12 RX ADMIN — Medication 100 MILLIGRAM(S): at 11:42

## 2023-01-12 RX ADMIN — Medication 2: at 11:41

## 2023-01-12 RX ADMIN — Medication 810 MILLIGRAM(S): at 19:49

## 2023-01-12 RX ADMIN — GABAPENTIN 300 MILLIGRAM(S): 400 CAPSULE ORAL at 07:39

## 2023-01-12 RX ADMIN — Medication 8 MILLIGRAM(S): at 07:18

## 2023-01-12 RX ADMIN — Medication 810 MILLIGRAM(S): at 10:37

## 2023-01-12 RX ADMIN — Medication 100 MILLIGRAM(S): at 04:43

## 2023-01-12 RX ADMIN — PANTOPRAZOLE SODIUM 40 MILLIGRAM(S): 20 TABLET, DELAYED RELEASE ORAL at 11:41

## 2023-01-12 RX ADMIN — SODIUM CHLORIDE 90 MILLILITER(S): 9 INJECTION, SOLUTION INTRAVENOUS at 14:22

## 2023-01-12 RX ADMIN — AMPICILLIN SODIUM AND SULBACTAM SODIUM 200 GRAM(S): 250; 125 INJECTION, POWDER, FOR SUSPENSION INTRAMUSCULAR; INTRAVENOUS at 07:19

## 2023-01-12 RX ADMIN — Medication 810 MILLIGRAM(S): at 12:19

## 2023-01-12 RX ADMIN — Medication 810 MILLIGRAM(S): at 18:52

## 2023-01-12 RX ADMIN — Medication 101.6 MILLIGRAM(S): at 14:26

## 2023-01-12 RX ADMIN — Medication 810 MILLIGRAM(S): at 05:43

## 2023-01-12 RX ADMIN — Medication 2: at 23:54

## 2023-01-12 RX ADMIN — Medication 1 PACKET(S): at 07:18

## 2023-01-12 RX ADMIN — Medication 1 APPLICATION(S): at 18:53

## 2023-01-12 RX ADMIN — AMPICILLIN SODIUM AND SULBACTAM SODIUM 200 GRAM(S): 250; 125 INJECTION, POWDER, FOR SUSPENSION INTRAMUSCULAR; INTRAVENOUS at 14:22

## 2023-01-12 RX ADMIN — Medication 810 MILLIGRAM(S): at 00:14

## 2023-01-12 RX ADMIN — POLYETHYLENE GLYCOL 3350 17 GRAM(S): 17 POWDER, FOR SOLUTION ORAL at 11:42

## 2023-01-12 RX ADMIN — Medication 810 MILLIGRAM(S): at 04:43

## 2023-01-12 RX ADMIN — Medication 2: at 17:35

## 2023-01-12 RX ADMIN — Medication 2: at 00:22

## 2023-01-12 RX ADMIN — SENNA PLUS 2 TABLET(S): 8.6 TABLET ORAL at 23:24

## 2023-01-12 RX ADMIN — Medication 2: at 06:47

## 2023-01-12 RX ADMIN — AMPICILLIN SODIUM AND SULBACTAM SODIUM 200 GRAM(S): 250; 125 INJECTION, POWDER, FOR SUSPENSION INTRAMUSCULAR; INTRAVENOUS at 01:36

## 2023-01-12 RX ADMIN — CHLORHEXIDINE GLUCONATE 15 MILLILITER(S): 213 SOLUTION TOPICAL at 06:47

## 2023-01-12 RX ADMIN — ENOXAPARIN SODIUM 40 MILLIGRAM(S): 100 INJECTION SUBCUTANEOUS at 16:26

## 2023-01-12 NOTE — DIETITIAN INITIAL EVALUATION ADULT - ENTERAL
Recommend Jevity 1.2 via NGT @ 50ml/hr x24hrs (provides 1200ml TV, 1440kcal, 67gprotein, 968ml free water) +150ml water flushes q4hrs (900ml total). Start at 20ml/hr x24hrs and advance 10ml q4hrs to goal.

## 2023-01-12 NOTE — DIETITIAN INITIAL EVALUATION ADULT - PERTINENT MEDS FT
MEDICATIONS  (STANDING):  ampicillin/sulbactam  IVPB 3 Gram(s) IV Intermittent every 6 hours  bacitracin   Ointment 1 Application(s) Topical every 12 hours  chlorhexidine 0.12% Liquid 15 milliLiter(s) Oral Mucosa two times a day  dexAMETHasone  IVPB 8 milliGRAM(s) IV Intermittent once  dextrose 5%. 1000 milliLiter(s) (100 mL/Hr) IV Continuous <Continuous>  dextrose 5%. 1000 milliLiter(s) (50 mL/Hr) IV Continuous <Continuous>  dextrose 50% Injectable 25 Gram(s) IV Push once  dextrose 50% Injectable 12.5 Gram(s) IV Push once  dextrose 50% Injectable 25 Gram(s) IV Push once  enoxaparin Injectable 40 milliGRAM(s) SubCutaneous every 24 hours  gabapentin Solution 300 milliGRAM(s) Oral two times a day  glucagon  Injectable 1 milliGRAM(s) IntraMuscular once  insulin lispro (ADMELOG) corrective regimen sliding scale   SubCutaneous every 6 hours  lactated ringers. 1000 milliLiter(s) (90 mL/Hr) IV Continuous <Continuous>  pantoprazole  Injectable 40 milliGRAM(s) IV Push daily  polyethylene glycol 3350 17 Gram(s) Oral daily  polymyxin 654679 Unit(s) 355621 Unit(s) Topical once  senna 2 Tablet(s) Oral at bedtime    MEDICATIONS  (PRN):  acetaminophen    Suspension .. 810 milliGRAM(s) Oral every 6 hours PRN Moderate Pain (4 - 6)  dextrose Oral Gel 15 Gram(s) Oral once PRN Blood Glucose LESS THAN 70 milliGRAM(s)/deciliter  HYDROmorphone  Injectable 0.5 milliGRAM(s) IV Push once PRN breakthrough  oxyCODONE    Solution 5 milliGRAM(s) Oral every 3 hours PRN Severe Pain (7 - 10)

## 2023-01-12 NOTE — PROGRESS NOTE ADULT - SUBJECTIVE AND OBJECTIVE BOX
INTERVAL/OVERNIGHT EVENTS: NAEO.     SUBJECTIVE: This AM doing well and pain under control. No CP/SOB. No N/V or abd pain.     POD # 2     Neurologic Medications  acetaminophen    Suspension .. 810 milliGRAM(s) Oral every 6 hours PRN Moderate Pain (4 - 6)  gabapentin Solution 300 milliGRAM(s) Oral two times a day  HYDROmorphone  Injectable 0.5 milliGRAM(s) IV Push once PRN breakthrough  oxyCODONE    Solution 5 milliGRAM(s) Oral every 3 hours PRN Severe Pain (7 - 10)    Gastrointestinal Medications  lactated ringers. 1000 milliLiter(s) IV Continuous <Continuous>  pantoprazole  Injectable 40 milliGRAM(s) IV Push daily  polyethylene glycol 3350 17 Gram(s) Oral daily  senna 2 Tablet(s) Oral at bedtime    Hematologic/Oncologic Medications  enoxaparin Injectable 40 milliGRAM(s) SubCutaneous every 24 hours    Antimicrobial/Immunologic Medications  ampicillin/sulbactam  IVPB 3 Gram(s) IV Intermittent every 6 hours    Endocrine/Metabolic Medications  dexAMETHasone  IVPB 8 milliGRAM(s) IV Intermittent once  glucagon  Injectable 1 milliGRAM(s) IntraMuscular once  insulin lispro (ADMELOG) corrective regimen sliding scale   SubCutaneous every 6 hours    Topical/Other Medications  bacitracin   Ointment 1 Application(s) Topical every 12 hours  chlorhexidine 0.12% Liquid 15 milliLiter(s) Oral Mucosa two times a day  polymyxin 637959 Unit(s) 855130 Unit(s) Topical once    MEDICATIONS  (PRN):  acetaminophen    Suspension .. 810 milliGRAM(s) Oral every 6 hours PRN Moderate Pain (4 - 6)  dextrose Oral Gel 15 Gram(s) Oral once PRN Blood Glucose LESS THAN 70 milliGRAM(s)/deciliter  HYDROmorphone  Injectable 0.5 milliGRAM(s) IV Push once PRN breakthrough  oxyCODONE    Solution 5 milliGRAM(s) Oral every 3 hours PRN Severe Pain (7 - 10)    I&O's Detail    11 Jan 2023 07:01  -  12 Jan 2023 07:00  --------------------------------------------------------  IN:    IV PiggyBack: 100 mL    IV PiggyBack: 50 mL    IV PiggyBack: 200 mL    IV PiggyBack: 200 mL    Lactated Ringers: 1170 mL  Total IN: 1720 mL    OUT:    Bulb (mL): 55 mL    Indwelling Catheter - Urethral (mL): 1670 mL  Total OUT: 1725 mL    Total NET: -5 mL    12 Jan 2023 07:01  -  12 Jan 2023 14:08  --------------------------------------------------------  IN:    Enteral Tube Flush: 120 mL    IV PiggyBack: 100 mL    IV PiggyBack: 100 mL    Lactated Ringers: 540 mL  Total IN: 860 mL    OUT:    Bulb (mL): 20 mL  Total OUT: 20 mL    Total NET: 840 mL    Vital Signs Last 24 Hrs  T(C): 37.2 (12 Jan 2023 12:00), Max: 37.2 (11 Jan 2023 19:35)  T(F): 99 (12 Jan 2023 12:00), Max: 99 (12 Jan 2023 12:00)  HR: 82 (12 Jan 2023 12:00) (63 - 93)  BP: 122/61 (12 Jan 2023 12:00) (106/65 - 130/62)  BP(mean): 86 (12 Jan 2023 12:00) (65 - 94)  RR: 21 (12 Jan 2023 12:00) (10 - 23)  SpO2: 97% (12 Jan 2023 12:00) (96% - 100%)      GENERAL: NAD, AXOx3, follows commands  HEENT: NCAT, MMM, incisions C/D/I, penrose site without hemotoma/swelling.  RESP: Nonlabored breathing, No respiratory distress, on NC, lungs CTA b/l  CARD: Normal rate, Normal peripheral perfusion, no murmurs  GI: Soft, ND, NT  EXTREM: WWP, No edema, No gross deformity of extremities, MOSES L thigh SS output, incision CDI  SKIN: No rashes, no lesions  NEURO: No focal motor or sensory deficits    LABS:                        9.6    9.16  )-----------( 329      ( 12 Jan 2023 05:32 )             29.2     01-12    137  |  106  |  7   ----------------------------<  174<H>  4.1   |  26  |  0.65    Ca    8.2<L>      12 Jan 2023 05:32  Phos  2.6     01-12  Mg     2.2     01-12

## 2023-01-12 NOTE — PROGRESS NOTE ADULT - ASSESSMENT
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  PLAN:  - Cam boot today        POD 1:      Consult nutrition, start tube feeds and advance to continuous goal.     Paris catheter removal and trial of void if extubated     RN Flap checks Q1 for 24 hours      OOB to chair      Discontinue ABX      Steroids completed     Continue Ondansetron PRN nausea/vomiting , PO Senna once daily, Miralax 17g once daily,  Chlorhexidine swish and spit, Esomeprazole, Enoxaparin, Gabapentin, Acetaminophen     Continue incentive spirometry and SCD’s          Stacy Motley PA-C  01-12-23 @ 10:26

## 2023-01-12 NOTE — DIETITIAN INITIAL EVALUATION ADULT - NS FNS DIET ORDER
Diet, NPO with Tube Feed:   Tube Feeding Modality: Nasogastric  Jevity 1.2 Moshe (JEVITY1.2RTH)  Total Volume for 24 Hours (mL): 1080  Total Number of Cans: 5  Continuous  Starting Tube Feed Rate {mL per Hour}: 20  Increase Tube Feed Rate by (mL): 10     Every 4 hours  Until Goal Tube Feed Rate (mL per Hour): 45  Tube Feed Duration (in Hours): 24  Tube Feed Start Time: 13:30  Liquid Protein Supplement     Qty per Day:  1 (01-12-23 @ 13:04)

## 2023-01-12 NOTE — PROGRESS NOTE ADULT - SUBJECTIVE AND OBJECTIVE BOX
OTOLARYNGOLOGY (ENT) PROGRESS NOTE    PATIENT: WALI HOFFMANN  MRN: [id number]  : 64  [id number]  DATE OF SERVICE:  23  			          Subjective/ Interval:   : patient POD 1, intraoral incisions intact, neck is soft and flat, SS drainage from [place], cook signal strong, elastics in place   ALLERGIES:  No Known Drug Allergies  Nuts (Anaphylaxis)      MEDICATIONS:  Antiinfectives:   ampicillin/sulbactam  IVPB 3 Gram(s) IV Intermittent every 6 hours  metroNIDAZOLE  IVPB 500 milliGRAM(s) IV Intermittent every 8 hours    IV fluids:  dextrose 5%. 1000 milliLiter(s) IV Continuous <Continuous>  dextrose 5%. 1000 milliLiter(s) IV Continuous <Continuous>  lactated ringers. 1000 milliLiter(s) IV Continuous <Continuous>    Hematologic/Anticoagulation:  enoxaparin Injectable 40 milliGRAM(s) SubCutaneous every 24 hours    Pain medications/Neuro:  acetaminophen    Suspension .. 810 milliGRAM(s) Oral every 6 hours PRN  gabapentin Solution 300 milliGRAM(s) Oral two times a day  HYDROmorphone  Injectable 0.5 milliGRAM(s) IV Push once PRN  oxyCODONE    Solution 5 milliGRAM(s) Oral every 3 hours PRN    Endocrine Medications:   dexAMETHasone  IVPB 8 milliGRAM(s) IV Intermittent once  dextrose 50% Injectable 25 Gram(s) IV Push once  dextrose 50% Injectable 12.5 Gram(s) IV Push once  dextrose 50% Injectable 25 Gram(s) IV Push once  dextrose Oral Gel 15 Gram(s) Oral once PRN  glucagon  Injectable 1 milliGRAM(s) IntraMuscular once  insulin lispro (ADMELOG) corrective regimen sliding scale   SubCutaneous every 6 hours    All other standing medications:   bacitracin   Ointment 1 Application(s) Topical every 12 hours  chlorhexidine 0.12% Liquid 15 milliLiter(s) Oral Mucosa two times a day  pantoprazole  Injectable 40 milliGRAM(s) IV Push daily  polyethylene glycol 3350 17 Gram(s) Oral daily  polymyxin 132383 Unit(s) 277948 Unit(s) Topical once  senna 2 Tablet(s) Oral at bedtime    All other PRN medications:    Vital Signs Last 24 Hrs  T(C): 36.9 (2023 09:44), Max: 37.2 (2023 19:35)  T(F): 98.4 (2023 09:44), Max: 98.9 (2023 19:35)  HR: 82 (2023 09:44) (63 - 93)  BP: 116/58 (2023 08:00) (116/58 - 130/62)  BP(mean): 82 (2023 08:00) (82 - 94)  RR: 17 (2023 09:44) (10 - 23)  SpO2: 97% (2023 09:44) (96% - 100%)    Parameters below as of 2023 07:00  Patient On (Oxygen Delivery Method): room air           @ 07:  -   @ 07:00  --------------------------------------------------------  IN:    IV PiggyBack: 100 mL    IV PiggyBack: 50 mL    IV PiggyBack: 200 mL    IV PiggyBack: 200 mL    Lactated Ringers: 1170 mL  Total IN: 1720 mL    OUT:    Bulb (mL): 55 mL    Indwelling Catheter - Urethral (mL): 1670 mL  Total OUT: 1725 mL    Total NET: -5 mL       @ 07: @ 10:26  --------------------------------------------------------  IN:    IV PiggyBack: 100 mL  Total IN: 100 mL    OUT:  Total OUT: 0 mL    Total NET: 100 mL          23 @ 07:01  -  23 @ 07:00  --------------------------------------------------------  IN:  Total IN: 0 mL    OUT:    Bulb (mL): 55 mL  Total OUT: 55 mL    Total NET: -55 mL      PHYSICAL EXAM:  GEN: appears stated age, NAD  NEURO: alert & oriented x   HEENT: intraoral incisions intact, neck soft and flat, with SS drainage from [place], cook signal strong, elastics in place   CVS: regular rate and rhythm, no ectopy on monitor  Pulm: normal respiratory excursions, not tachypneic, no labored breathing  Abd: non-distended  Ext: moving all four extremities, leg incision intact, 55 cc SS fluid in drain      LABS                       9.6    9.16  )-----------( 329      ( 2023 05:32 )             29.2        137  |  106  |  7   ----------------------------<  174<H>  4.1   |  26  |  0.65    Ca    8.2<L>      2023 05:32  Phos  2.6       Mg     2.2            Endocrine Panel-  Calcium, Total Serum: 8.2 mg/dL ( @ 05:32)  Calcium, Total Serum: 8.2 mg/dL ( @ 16:47)        RADIOLOGY & ADDITIONAL STUDIES:

## 2023-01-12 NOTE — PROGRESS NOTE ADULT - ASSESSMENT
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 now s/p L segmental mandibulectomy L FFF, L neck exploration and in SICU for flap checks.     Neuro: Tylenol ATC, Gabapentin 600mg QD, PRN Dilaudid    HEENT: Flap checks q2h, Head neutral, Chlorhexidine PO BID, Bacitracin Ointment. Decadron 8q8 x3  CV: HD stable, Maintain MAP > 65  Pulm: on NC  GI: NPO, decrease LR 90, DHT Jevity 1.2 at 45; PPI, Senna, Miralax, PRN Zofran   : [place] removed, TOV due, Strict I&Os  ID: Mary-op Abx x24 hrs- Unasyn ( 1/11) Flagyl(1/11) completed.   Endo: mISS on decadron 8q8 x3 completed  Heme: Active T&S, Hgb >8   ppx: SCDs, SQL on POD#1  Lines: PIV, DC Rianna(1/11-12)   Wounds: MOSES x in leg   PT/OT: Not Ordered, needs CAM Boot, PT on POD2

## 2023-01-12 NOTE — DIETITIAN INITIAL EVALUATION ADULT - ADD RECOMMEND
1. Monitor tolerance to TF, maintain aspiration precautions at all times  2. Pain and bowel regimens per team  3. RD to remain available prn

## 2023-01-12 NOTE — DIETITIAN INITIAL EVALUATION ADULT - PERTINENT LABORATORY DATA
01-12    137  |  106  |  7   ----------------------------<  174<H>  4.1   |  26  |  0.65    Ca    8.2<L>      12 Jan 2023 05:32  Phos  2.6     01-12  Mg     2.2     01-12    POCT Blood Glucose.: 161 mg/dL (01-12-23 @ 11:32)  A1C with Estimated Average Glucose Result: 5.6 % (01-12-23 @ 05:32)

## 2023-01-12 NOTE — DIETITIAN INITIAL EVALUATION ADULT - OTHER INFO
62F presents with recent diagnosis of left posterior mandible ameloblastoma presenting today for Left mandibular composite resection, exploration of left neck for microvascular anastomosis, reconstruction with left Fibula free flap, placement of dental implants 1/11.    Pt seen in room for nutrition assessment. MAP 86. Labs reviewed: POCT blood glucose 150-160s. NGT placed and plan to start feeds today. Recommend Jevity 1.2 via NGT @ 50ml/hr x24hrs (provides 1200ml TV, 1440kcal, 67gprotein, 968ml free water) +150ml water flushes q4hrs (900ml total). Start at 20ml/hr x24hrs and advance 10ml q4hrs to goal. Monitor tolerance. No N/V/D/C noted at present. No edema. On pain and bowel regimens. Please see full nutrition recommendations below. Will continue to follow per RD protocol.

## 2023-01-12 NOTE — PROGRESS NOTE ADULT - NS ATTEND AMEND GEN_ALL_CORE FT
Pt doing well and doppler signal strong. Breathing comfortably. Start enteral feeds and continue non weight bearing per ENT.

## 2023-01-13 LAB
ANION GAP SERPL CALC-SCNC: 10 MMOL/L — SIGNIFICANT CHANGE UP (ref 5–17)
BUN SERPL-MCNC: 10 MG/DL — SIGNIFICANT CHANGE UP (ref 7–23)
CALCIUM SERPL-MCNC: 8.7 MG/DL — SIGNIFICANT CHANGE UP (ref 8.4–10.5)
CHLORIDE SERPL-SCNC: 107 MMOL/L — SIGNIFICANT CHANGE UP (ref 96–108)
CO2 SERPL-SCNC: 24 MMOL/L — SIGNIFICANT CHANGE UP (ref 22–31)
CREAT SERPL-MCNC: 0.64 MG/DL — SIGNIFICANT CHANGE UP (ref 0.5–1.3)
EGFR: 101 ML/MIN/1.73M2 — SIGNIFICANT CHANGE UP
GLUCOSE BLDC GLUCOMTR-MCNC: 112 MG/DL — HIGH (ref 70–99)
GLUCOSE BLDC GLUCOMTR-MCNC: 119 MG/DL — HIGH (ref 70–99)
GLUCOSE BLDC GLUCOMTR-MCNC: 122 MG/DL — HIGH (ref 70–99)
GLUCOSE BLDC GLUCOMTR-MCNC: 129 MG/DL — HIGH (ref 70–99)
GLUCOSE SERPL-MCNC: 128 MG/DL — HIGH (ref 70–99)
HCT VFR BLD CALC: 29.1 % — LOW (ref 34.5–45)
HGB BLD-MCNC: 9.4 G/DL — LOW (ref 11.5–15.5)
MAGNESIUM SERPL-MCNC: 2.1 MG/DL — SIGNIFICANT CHANGE UP (ref 1.6–2.6)
MCHC RBC-ENTMCNC: 29.3 PG — SIGNIFICANT CHANGE UP (ref 27–34)
MCHC RBC-ENTMCNC: 32.3 GM/DL — SIGNIFICANT CHANGE UP (ref 32–36)
MCV RBC AUTO: 90.7 FL — SIGNIFICANT CHANGE UP (ref 80–100)
NRBC # BLD: 0 /100 WBCS — SIGNIFICANT CHANGE UP (ref 0–0)
PHOSPHATE SERPL-MCNC: 2.4 MG/DL — LOW (ref 2.5–4.5)
PLATELET # BLD AUTO: 330 K/UL — SIGNIFICANT CHANGE UP (ref 150–400)
POTASSIUM SERPL-MCNC: 4.4 MMOL/L — SIGNIFICANT CHANGE UP (ref 3.5–5.3)
POTASSIUM SERPL-SCNC: 4.4 MMOL/L — SIGNIFICANT CHANGE UP (ref 3.5–5.3)
RBC # BLD: 3.21 M/UL — LOW (ref 3.8–5.2)
RBC # FLD: 13.9 % — SIGNIFICANT CHANGE UP (ref 10.3–14.5)
SODIUM SERPL-SCNC: 141 MMOL/L — SIGNIFICANT CHANGE UP (ref 135–145)
WBC # BLD: 10.46 K/UL — SIGNIFICANT CHANGE UP (ref 3.8–10.5)
WBC # FLD AUTO: 10.46 K/UL — SIGNIFICANT CHANGE UP (ref 3.8–10.5)

## 2023-01-13 PROCEDURE — 99232 SBSQ HOSP IP/OBS MODERATE 35: CPT | Mod: GC

## 2023-01-13 RX ORDER — CHLORHEXIDINE GLUCONATE 213 G/1000ML
1 SOLUTION TOPICAL DAILY
Refills: 0 | Status: DISCONTINUED | OUTPATIENT
Start: 2023-01-13 | End: 2023-01-18

## 2023-01-13 RX ORDER — SODIUM,POTASSIUM PHOSPHATES 278-250MG
2 POWDER IN PACKET (EA) ORAL ONCE
Refills: 0 | Status: COMPLETED | OUTPATIENT
Start: 2023-01-13 | End: 2023-01-13

## 2023-01-13 RX ORDER — POTASSIUM PHOSPHATE, MONOBASIC POTASSIUM PHOSPHATE, DIBASIC 236; 224 MG/ML; MG/ML
15 INJECTION, SOLUTION INTRAVENOUS ONCE
Refills: 0 | Status: COMPLETED | OUTPATIENT
Start: 2023-01-13 | End: 2023-01-13

## 2023-01-13 RX ADMIN — CHLORHEXIDINE GLUCONATE 15 MILLILITER(S): 213 SOLUTION TOPICAL at 06:14

## 2023-01-13 RX ADMIN — POLYETHYLENE GLYCOL 3350 17 GRAM(S): 17 POWDER, FOR SOLUTION ORAL at 11:31

## 2023-01-13 RX ADMIN — GABAPENTIN 300 MILLIGRAM(S): 400 CAPSULE ORAL at 17:47

## 2023-01-13 RX ADMIN — Medication 810 MILLIGRAM(S): at 18:37

## 2023-01-13 RX ADMIN — POTASSIUM PHOSPHATE, MONOBASIC POTASSIUM PHOSPHATE, DIBASIC 62.5 MILLIMOLE(S): 236; 224 INJECTION, SOLUTION INTRAVENOUS at 09:01

## 2023-01-13 RX ADMIN — Medication 2 PACKET(S): at 11:31

## 2023-01-13 RX ADMIN — CHLORHEXIDINE GLUCONATE 15 MILLILITER(S): 213 SOLUTION TOPICAL at 17:44

## 2023-01-13 RX ADMIN — CHLORHEXIDINE GLUCONATE 1 APPLICATION(S): 213 SOLUTION TOPICAL at 15:25

## 2023-01-13 RX ADMIN — Medication 810 MILLIGRAM(S): at 17:45

## 2023-01-13 RX ADMIN — ENOXAPARIN SODIUM 40 MILLIGRAM(S): 100 INJECTION SUBCUTANEOUS at 17:44

## 2023-01-13 RX ADMIN — Medication 810 MILLIGRAM(S): at 09:30

## 2023-01-13 RX ADMIN — Medication 810 MILLIGRAM(S): at 09:02

## 2023-01-13 RX ADMIN — Medication 1 APPLICATION(S): at 06:20

## 2023-01-13 RX ADMIN — GABAPENTIN 300 MILLIGRAM(S): 400 CAPSULE ORAL at 06:14

## 2023-01-13 RX ADMIN — Medication 1 APPLICATION(S): at 17:44

## 2023-01-13 RX ADMIN — PANTOPRAZOLE SODIUM 40 MILLIGRAM(S): 20 TABLET, DELAYED RELEASE ORAL at 11:31

## 2023-01-13 NOTE — PROGRESS NOTE ADULT - ASSESSMENT
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    PLAN:     POD 2    - Remove Penrose     - Flap checks Q2 x 24 hours      - Diet: Make sure tube feeds are advanced to goal      - PT consult - aim to walk 3-4x a day  with cam boot     - Continue Ondansetron PRN nausea/vomiting , PO Senna once daily, Miralax 17g once daily,  Chlorhexidine swish and spit, Esomeprazole, Enoxaparin, Gabapentin, Acetaminophen     - Continue incentive spirometry and SCD’s         [facility] at [phone] with any questions/concerns.    Stacy Motley PA-C  01-13-23 @ 08:20     Assessment and Plan:  WALI HOFFMANN is a 62F left post mandible ameloblastoma s/p left mandibulectomy, neck exploration, implants, left FFF.    PLAN:     POD 2    - Remove Penrose     - Flap checks Q2 x 24 hours      - Diet: Make sure tube feeds are advanced to goal      - PT consult - aim to walk 3-4x a day  with cam boot     - Continue Ondansetron PRN nausea/vomiting , PO Senna once daily, Miralax 17g once daily,  Chlorhexidine swish and spit, Esomeprazole, Enoxaparin, Gabapentin, Acetaminophen     - Continue incentive spirometry and SCD’s     - OOB to chair         [facility] at [phone] with any questions/concerns.    Stacy Motley PA-C  01-13-23 @ 08:20

## 2023-01-13 NOTE — PROGRESS NOTE ADULT - SUBJECTIVE AND OBJECTIVE BOX
Interval Events:  No events overnight   Patient seen and examined at bedside.      Allergies    No Known Drug Allergies  Nuts (Anaphylaxis)    Intolerances        Vital Signs Last 24 Hrs  T(C): 36.8 (13 Jan 2023 05:00), Max: 37.2 (12 Jan 2023 12:00)  T(F): 98.2 (13 Jan 2023 05:00), Max: 99 (12 Jan 2023 12:00)  HR: 73 (13 Jan 2023 06:00) (68 - 98)  BP: 133/69 (13 Jan 2023 06:00) (97/54 - 133/69)  BP(mean): 95 (13 Jan 2023 06:00) (65 - 97)  RR: 13 (13 Jan 2023 06:00) (11 - 23)  SpO2: 97% (13 Jan 2023 06:00) (94% - 97%)    Parameters below as of 13 Jan 2023 06:00  Patient On (Oxygen Delivery Method): room air        01-12 @ 07:01 - 01-13 @ 07:00  --------------------------------------------------------  IN: 1775 mL / OUT: 480 mL / NET: 1295 mL      01-12 @ 07:01 - 01-13 @ 07:00  --------------------------------------------------------  IN: 1775 mL / OUT: 480 mL / NET: 1295 mL        Physical Exam:     Gen: NAD well nourished  Neuro: A&OX3 No deficits  HEENT: NCAT, MMM, incisions C/D/I, penrose site without hemotoma/swelling. Doppler:   CV:RRR Reg s1s2 no M  Pulm: CTA b/l No w/r/r  Abd: Soft NT ND + BS  Ext: No C/C/E   Vasc: + DP b/l   Skin: no rashes noted  MSK: No joint swelling  Psych: No signs of anxiety or depression      LABS:      CBC Full  -  ( 13 Jan 2023 06:11 )  WBC Count : 10.46 K/uL  RBC Count : 3.21 M/uL  Hemoglobin : 9.4 g/dL  Hematocrit : 29.1 %  Platelet Count - Automated : 330 K/uL  Mean Cell Volume : 90.7 fl  Mean Cell Hemoglobin : 29.3 pg  Mean Cell Hemoglobin Concentration : 32.3 gm/dL  Auto Neutrophil # : x  Auto Lymphocyte # : x  Auto Monocyte # : x  Auto Eosinophil # : x  Auto Basophil # : x  Auto Neutrophil % : x  Auto Lymphocyte % : x  Auto Monocyte % : x  Auto Eosinophil % : x  Auto Basophil % : x    01-13    141  |  107  |  10  ----------------------------<  128<H>  4.4   |  24  |  0.64    Ca    8.7      13 Jan 2023 06:11  Phos  2.4     01-13  Mg     2.1     01-13                      RADIOLOGY & ADDITIONAL STUDIES (The following images were personally reviewed):          A/p: 63 y/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 now s/p L segmental mandibulectomy L FFF, L neck exploration 1/11 and in SICU for flap checks.     Neuro: Tylenol ATC, Gabapentin 600mg QD, PRN Dilaudid    HEENT: Flap checks q2h, Head neutral, Chlorhexidine PO BID, Bacitracin Ointment. Decadron 8q8 x3  CV: HD stable, Maintain MAP > 65  Pulm: on NC  GI: NPO, decrease LR 90, DHT Jevity 1.2 at 45; PPI, Senna, Miralax Daily, PRN Zofran   : Voids, Strict I&Os  ID: Mary-op Abx x24 hrs- Unasyn ( 1/11) Flagyl(1/11) completed.   Endo: mISS on decadron 8q8 x3 completed  Heme: Active T&S, Hgb >8   ppx: SCDs, SQL   Lines: PIV, DC Rianna(1/11-12)   Wounds: MOSES x in leg   PT/OT: Not Ordered, has CAM Boot  Dispo: SDU today    Interval Events:  No events overnight   Patient seen and examined at bedside.      Allergies    No Known Drug Allergies  Nuts (Anaphylaxis)    Intolerances        Vital Signs Last 24 Hrs  T(C): 36.8 (13 Jan 2023 05:00), Max: 37.2 (12 Jan 2023 12:00)  T(F): 98.2 (13 Jan 2023 05:00), Max: 99 (12 Jan 2023 12:00)  HR: 73 (13 Jan 2023 06:00) (68 - 98)  BP: 133/69 (13 Jan 2023 06:00) (97/54 - 133/69)  BP(mean): 95 (13 Jan 2023 06:00) (65 - 97)  RR: 13 (13 Jan 2023 06:00) (11 - 23)  SpO2: 97% (13 Jan 2023 06:00) (94% - 97%)    Parameters below as of 13 Jan 2023 06:00  Patient On (Oxygen Delivery Method): room air        01-12 @ 07:01 - 01-13 @ 07:00  --------------------------------------------------------  IN: 1775 mL / OUT: 480 mL / NET: 1295 mL      01-12 @ 07:01 - 01-13 @ 07:00  --------------------------------------------------------  IN: 1775 mL / OUT: 480 mL / NET: 1295 mL        Physical Exam:     Gen: NAD well nourished  Neuro: A&OX3 No deficits  HEENT: NCAT, MMM, incisions C/D/I, penrose site without hemotoma/swelling.  CV:RRR Reg s1s2 no M  Pulm: CTA b/l No w/r/r  Abd: Soft NT ND + BS  Ext: No C/C/E   Vasc: + DP b/l   Skin: no rashes noted  MSK: No joint swelling  Psych: No signs of anxiety or depression      LABS:      CBC Full  -  ( 13 Jan 2023 06:11 )  WBC Count : 10.46 K/uL  RBC Count : 3.21 M/uL  Hemoglobin : 9.4 g/dL  Hematocrit : 29.1 %  Platelet Count - Automated : 330 K/uL  Mean Cell Volume : 90.7 fl  Mean Cell Hemoglobin : 29.3 pg  Mean Cell Hemoglobin Concentration : 32.3 gm/dL  Auto Neutrophil # : x  Auto Lymphocyte # : x  Auto Monocyte # : x  Auto Eosinophil # : x  Auto Basophil # : x  Auto Neutrophil % : x  Auto Lymphocyte % : x  Auto Monocyte % : x  Auto Eosinophil % : x  Auto Basophil % : x    01-13    141  |  107  |  10  ----------------------------<  128<H>  4.4   |  24  |  0.64    Ca    8.7      13 Jan 2023 06:11  Phos  2.4     01-13  Mg     2.1     01-13                      RADIOLOGY & ADDITIONAL STUDIES (The following images were personally reviewed):          A/p: 61 y/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 now s/p L segmental mandibulectomy L FFF, L neck exploration 1/11 and in SICU for flap checks.     Neuro: Tylenol ATC, Gabapentin 600mg QD, PRN Dilaudid    HEENT: Flap checks q2h, Head neutral, Chlorhexidine PO BID, Bacitracin Ointment. Dc: Decadron 8q8 x3  CV: HD stable, Maintain MAP > 65 IVF dc'd  Pulm: on NC  GI: NPO, DHT Jevity 1.2 at 45; PPI, Senna, Miralax Daily, PRN Zofran   : Voids, Strict I&Os  ID: Mary-op Abx x24 hrs- Unasyn ( 1/11) Flagyl(1/11) completed.   Endo: mISS on decadron 8q8 x3 completed  Heme: Active T&S, Hgb >8   ppx: SCDs, SQL   Lines: PIV, DC Rianna(1/11-12)   Wounds: MOSES x in leg   PT/OT: Not Ordered, has CAM Boot  Dispo: SDU today

## 2023-01-13 NOTE — PHYSICAL THERAPY INITIAL EVALUATION ADULT - GENERAL OBSERVATIONS, REHAB EVAL
PT IE Completed. Pt received semi-supine in bed, A&Ox4, +ICU monitoring, +RA,+heplock, NG tube (feeds held), L fibular incision and MOSES drain, L penrose, in NAD and agreeable to work with PT, SHAYY Cunningham notified, family at bedside.Pt s/p L segmental mandibulectomy for L ameloblastoma. PT exam shows pain limiting mobility during ADL and functional mobility. Pt completed bed mob, transfers and gait. Pt left seated in bedside chair,+call preciado within reach, SHAYY Cunningham present notified. Pt can benefit from PT in order to improve quality of life by increasing strength/endurance/balance with functional mobility and ADLS.

## 2023-01-13 NOTE — PHYSICAL THERAPY INITIAL EVALUATION ADULT - PERTINENT HX OF CURRENT PROBLEM, REHAB EVAL
63y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 now s/p L segmental mandibulectomy L FFF, L neck exploration 1/11 and in SICU for flap checks.

## 2023-01-13 NOTE — PROGRESS NOTE ADULT - SUBJECTIVE AND OBJECTIVE BOX
OTOLARYNGOLOGY (ENT) PROGRESS NOTE    PATIENT: WALI HOFFMANN  MRN: [id number]  : -64  [id number]  DATE OF SERVICE:  23  			         Subjective/ Interval:     : patient POD 1, intraoral incisions intact, neck is soft and flat, SS drainage from [place], cook signal strong, elastics in place   : : patient seen bedside intraoral incision intact, neck soft and flat, continues to have drainage form penrose, cook signal strong, elastics in place,  30 cc SS fluid   ALLERGIES:  No Known Drug Allergies  Nuts (Anaphylaxis)      MEDICATIONS:  Antiinfectives:     IV fluids:  dextrose 5%. 1000 milliLiter(s) IV Continuous <Continuous>  dextrose 5%. 1000 milliLiter(s) IV Continuous <Continuous>  potassium phosphate IVPB 15 milliMole(s) IV Intermittent once    Hematologic/Anticoagulation:  enoxaparin Injectable 40 milliGRAM(s) SubCutaneous every 24 hours    Pain medications/Neuro:  acetaminophen    Suspension .. 810 milliGRAM(s) Oral every 6 hours PRN  gabapentin Solution 300 milliGRAM(s) Oral two times a day    Endocrine Medications:   dextrose 50% Injectable 25 Gram(s) IV Push once  dextrose 50% Injectable 12.5 Gram(s) IV Push once  dextrose 50% Injectable 25 Gram(s) IV Push once  dextrose Oral Gel 15 Gram(s) Oral once PRN  glucagon  Injectable 1 milliGRAM(s) IntraMuscular once  insulin lispro (ADMELOG) corrective regimen sliding scale   SubCutaneous every 6 hours    All other standing medications:   bacitracin   Ointment 1 Application(s) Topical every 12 hours  chlorhexidine 0.12% Liquid 15 milliLiter(s) Oral Mucosa two times a day  chlorhexidine 2% Cloths 1 Application(s) Topical daily  pantoprazole  Injectable 40 milliGRAM(s) IV Push daily  polyethylene glycol 3350 17 Gram(s) Oral daily  senna 2 Tablet(s) Oral at bedtime    All other PRN medications:    Vital Signs Last 24 Hrs  T(C): 36.8 (2023 05:00), Max: 37.2 (2023 12:00)  T(F): 98.2 (2023 05:00), Max: 99 (2023 12:00)  HR: 101 (2023 08:00) (68 - 101)  BP: 114/95 (2023 08:00) (97/54 - 133/69)  BP(mean): 102 (2023 08:00) (65 - 102)  RR: 19 (2023 08:00) (11 - 23)  SpO2: 95% (2023 08:00) (94% - 97%)    Parameters below as of 2023 08:00  Patient On (Oxygen Delivery Method): room air           @ 07:  -   @ 07:00  --------------------------------------------------------  IN:    Enteral Tube Flush: 170 mL    IV PiggyBack: 100 mL    IV PiggyBack: 200 mL    Jevity 1.2: 450 mL    Lactated Ringers: 900 mL  Total IN: 1820 mL    OUT:    Bulb (mL): 30 mL    Voided (mL): 450 mL  Total OUT: 480 mL    Total NET: 1340 mL       @ 07: @ 08:20  --------------------------------------------------------  IN:    Jevity 1.2: 45 mL  Total IN: 45 mL    OUT:    Bulb (mL): 0 mL    Voided (mL): 350 mL  Total OUT: 350 mL    Total NET: -305 mL          23 @ 07:  -  23 @ 07:00  --------------------------------------------------------  IN:  Total IN: 0 mL    OUT:    Bulb (mL): 30 mL  Total OUT: 30 mL    Total NET: -30 mL      23 @ 07:23 @ 08:20  --------------------------------------------------------  IN:  Total IN: 0 mL    OUT:    Bulb (mL): 0 mL  Total OUT: 0 mL    Total NET: 0 mL        PHYSICAL EXAM:  GEN: appears stated age, NAD  NEURO: alert & oriented x   HEENT: intraoral incisions intact, neck soft and flat, with SS drainage from penrose, HYLA Mobile signal strong, elastics in place   CVS: regular rate and rhythm, no ectopy on monitor  Pulm: normal respiratory excursions, not tachypneic, no labored breathing, room air   Abd: non-distended  Ext: moving all four extremities, leg incision intact, 30 cc SS fluid in drain        LABS                       9.4    10.46 )-----------( 330      ( 2023 06:11 )             29.1        141  |  107  |  10  ----------------------------<  128<H>  4.4   |  24  |  0.64    Ca    8.7      2023 06:11  Phos  2.4       Mg     2.1              Endocrine Panel-  Calcium, Total Serum: 8.7 mg/dL ( @ 06:11)

## 2023-01-13 NOTE — PHYSICAL THERAPY INITIAL EVALUATION ADULT - DIAGNOSIS, PT EVAL
7D: Impaired Integumentary Integrity Associated with Full-Thickness Skin Involvement and Scar Formation

## 2023-01-14 LAB
ANION GAP SERPL CALC-SCNC: 8 MMOL/L — SIGNIFICANT CHANGE UP (ref 5–17)
BUN SERPL-MCNC: 12 MG/DL — SIGNIFICANT CHANGE UP (ref 7–23)
CALCIUM SERPL-MCNC: 8.4 MG/DL — SIGNIFICANT CHANGE UP (ref 8.4–10.5)
CHLORIDE SERPL-SCNC: 105 MMOL/L — SIGNIFICANT CHANGE UP (ref 96–108)
CO2 SERPL-SCNC: 27 MMOL/L — SIGNIFICANT CHANGE UP (ref 22–31)
CREAT SERPL-MCNC: 0.72 MG/DL — SIGNIFICANT CHANGE UP (ref 0.5–1.3)
EGFR: 96 ML/MIN/1.73M2 — SIGNIFICANT CHANGE UP
GLUCOSE BLDC GLUCOMTR-MCNC: 103 MG/DL — HIGH (ref 70–99)
GLUCOSE BLDC GLUCOMTR-MCNC: 122 MG/DL — HIGH (ref 70–99)
GLUCOSE BLDC GLUCOMTR-MCNC: 150 MG/DL — HIGH (ref 70–99)
GLUCOSE SERPL-MCNC: 127 MG/DL — HIGH (ref 70–99)
HCT VFR BLD CALC: 28.9 % — LOW (ref 34.5–45)
HGB BLD-MCNC: 9.3 G/DL — LOW (ref 11.5–15.5)
MAGNESIUM SERPL-MCNC: 2 MG/DL — SIGNIFICANT CHANGE UP (ref 1.6–2.6)
MCHC RBC-ENTMCNC: 29.1 PG — SIGNIFICANT CHANGE UP (ref 27–34)
MCHC RBC-ENTMCNC: 32.2 GM/DL — SIGNIFICANT CHANGE UP (ref 32–36)
MCV RBC AUTO: 90.3 FL — SIGNIFICANT CHANGE UP (ref 80–100)
NRBC # BLD: 0 /100 WBCS — SIGNIFICANT CHANGE UP (ref 0–0)
PHOSPHATE SERPL-MCNC: 3.3 MG/DL — SIGNIFICANT CHANGE UP (ref 2.5–4.5)
PLATELET # BLD AUTO: 353 K/UL — SIGNIFICANT CHANGE UP (ref 150–400)
POTASSIUM SERPL-MCNC: 4.1 MMOL/L — SIGNIFICANT CHANGE UP (ref 3.5–5.3)
POTASSIUM SERPL-SCNC: 4.1 MMOL/L — SIGNIFICANT CHANGE UP (ref 3.5–5.3)
RBC # BLD: 3.2 M/UL — LOW (ref 3.8–5.2)
RBC # FLD: 14.1 % — SIGNIFICANT CHANGE UP (ref 10.3–14.5)
SODIUM SERPL-SCNC: 140 MMOL/L — SIGNIFICANT CHANGE UP (ref 135–145)
WBC # BLD: 7.12 K/UL — SIGNIFICANT CHANGE UP (ref 3.8–10.5)
WBC # FLD AUTO: 7.12 K/UL — SIGNIFICANT CHANGE UP (ref 3.8–10.5)

## 2023-01-14 RX ORDER — OXYCODONE HYDROCHLORIDE 5 MG/1
5 TABLET ORAL EVERY 6 HOURS
Refills: 0 | Status: DISCONTINUED | OUTPATIENT
Start: 2023-01-14 | End: 2023-01-18

## 2023-01-14 RX ADMIN — OXYCODONE HYDROCHLORIDE 5 MILLIGRAM(S): 5 TABLET ORAL at 03:50

## 2023-01-14 RX ADMIN — CHLORHEXIDINE GLUCONATE 1 APPLICATION(S): 213 SOLUTION TOPICAL at 11:22

## 2023-01-14 RX ADMIN — POLYETHYLENE GLYCOL 3350 17 GRAM(S): 17 POWDER, FOR SOLUTION ORAL at 11:23

## 2023-01-14 RX ADMIN — GABAPENTIN 300 MILLIGRAM(S): 400 CAPSULE ORAL at 17:33

## 2023-01-14 RX ADMIN — CHLORHEXIDINE GLUCONATE 15 MILLILITER(S): 213 SOLUTION TOPICAL at 05:19

## 2023-01-14 RX ADMIN — GABAPENTIN 300 MILLIGRAM(S): 400 CAPSULE ORAL at 06:56

## 2023-01-14 RX ADMIN — CHLORHEXIDINE GLUCONATE 15 MILLILITER(S): 213 SOLUTION TOPICAL at 17:17

## 2023-01-14 RX ADMIN — ENOXAPARIN SODIUM 40 MILLIGRAM(S): 100 INJECTION SUBCUTANEOUS at 16:24

## 2023-01-14 RX ADMIN — Medication 1 APPLICATION(S): at 17:16

## 2023-01-14 RX ADMIN — OXYCODONE HYDROCHLORIDE 5 MILLIGRAM(S): 5 TABLET ORAL at 03:16

## 2023-01-14 RX ADMIN — Medication 1 DROP(S): at 17:23

## 2023-01-14 RX ADMIN — Medication 1 APPLICATION(S): at 05:19

## 2023-01-14 RX ADMIN — PANTOPRAZOLE SODIUM 40 MILLIGRAM(S): 20 TABLET, DELAYED RELEASE ORAL at 11:23

## 2023-01-14 RX ADMIN — SENNA PLUS 2 TABLET(S): 8.6 TABLET ORAL at 23:29

## 2023-01-14 NOTE — PROGRESS NOTE ADULT - ASSESSMENT
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    PLAN:     POD 3       - Flap checks Q4 x 48 hours      - Diet: sips of clears, bolus tube feeds      - PT consult - aim to walk 3-4x a day  with cam boot     - Continue Ondansetron PRN nausea/vomiting , PO Senna once daily, Miralax 17g once daily,  Chlorhexidine swish and spit, Esomeprazole, Enoxaparin, Gabapentin, Acetaminophen     - Continue incentive spirometry and SCD’s     - OOB to chair with CAM boot    [facility] at [phone] with any questions/concerns.

## 2023-01-14 NOTE — PROGRESS NOTE ADULT - SUBJECTIVE AND OBJECTIVE BOX
OTOLARYNGOLOGY (ENT) PROGRESS NOTE    PATIENT: WALI HOFFMANN  MRN: [id number]  : 64  [id number]  DATE OF SERVICE:  23  			         Subjective/ Interval:     : patient POD 1, intraoral incisions intact, neck is soft and flat, SS drainage from [place], cook signal strong, elastics in place   : : patient seen bedside intraoral incision intact, neck soft and flat, continues to have drainage form penrose, cook signal strong, elastics in place,  30 cc SS fluid   : NAEON. examined at bedside. penrose removed yesterday. doppler signal strong. MOSES 35cc SS. Mild pitting edema of left ankle, no leg pain.    ALLERGIES:  No Known Drug Allergies  Nuts (Anaphylaxis)      MEDICATIONS:  Antiinfectives:     IV fluids:  dextrose 5%. 1000 milliLiter(s) IV Continuous <Continuous>  dextrose 5%. 1000 milliLiter(s) IV Continuous <Continuous>  potassium phosphate IVPB 15 milliMole(s) IV Intermittent once    Hematologic/Anticoagulation:  enoxaparin Injectable 40 milliGRAM(s) SubCutaneous every 24 hours    Pain medications/Neuro:  acetaminophen    Suspension .. 810 milliGRAM(s) Oral every 6 hours PRN  gabapentin Solution 300 milliGRAM(s) Oral two times a day    Endocrine Medications:   dextrose 50% Injectable 25 Gram(s) IV Push once  dextrose 50% Injectable 12.5 Gram(s) IV Push once  dextrose 50% Injectable 25 Gram(s) IV Push once  dextrose Oral Gel 15 Gram(s) Oral once PRN  glucagon  Injectable 1 milliGRAM(s) IntraMuscular once  insulin lispro (ADMELOG) corrective regimen sliding scale   SubCutaneous every 6 hours    All other standing medications:   bacitracin   Ointment 1 Application(s) Topical every 12 hours  chlorhexidine 0.12% Liquid 15 milliLiter(s) Oral Mucosa two times a day  chlorhexidine 2% Cloths 1 Application(s) Topical daily  pantoprazole  Injectable 40 milliGRAM(s) IV Push daily  polyethylene glycol 3350 17 Gram(s) Oral daily  senna 2 Tablet(s) Oral at bedtime    All other PRN medications:    ICU Vital Signs Last 24 Hrs  T(C): 37.1 (2023 09:38), Max: 37.1 (2023 00:24)  T(F): 98.8 (2023 09:38), Max: 98.8 (2023 00:24)  HR: 126 (2023 08:22) (81 - 126)  BP: 123/59 (2023 08:22) (100/54 - 149/65)  BP(mean): 81 (2023 08:22) (74 - 98)  ABP: --  ABP(mean): --  RR: 17 (2023 08:22) (13 - 20)  SpO2: 96% (2023 08:22) (96% - 97%)    O2 Parameters below as of 2023 08:22  Patient On (Oxygen Delivery Method): room air      PHYSICAL EXAM:  GEN: appears stated age, NAD  NEURO: alert & oriented x 4/  HEENT: intraoral incisions intact, neck soft and flat. cook signal strong, elastics in place   CVS: regular rate and rhythm, no ectopy on monitor  Pulm: normal respiratory excursions, not tachypneic, no labored breathing, room air   Abd: non-distended  Ext: moving all four extremities, leg incision intact, 35 cc SS fluid in drain. Left ankle pitting edema, warm, no pain       LABS                          9.3    7.12  )-----------( 353      ( 2023 05:30 )             28.9   -14    140  |  105  |  12  ----------------------------<  127<H>  4.1   |  27  |  0.72    Ca    8.4      2023 05:30  Phos  3.3     -14  Mg     2.0     -14

## 2023-01-15 LAB
ANION GAP SERPL CALC-SCNC: 6 MMOL/L — SIGNIFICANT CHANGE UP (ref 5–17)
BUN SERPL-MCNC: 12 MG/DL — SIGNIFICANT CHANGE UP (ref 7–23)
CALCIUM SERPL-MCNC: 8.9 MG/DL — SIGNIFICANT CHANGE UP (ref 8.4–10.5)
CHLORIDE SERPL-SCNC: 104 MMOL/L — SIGNIFICANT CHANGE UP (ref 96–108)
CO2 SERPL-SCNC: 31 MMOL/L — SIGNIFICANT CHANGE UP (ref 22–31)
CREAT SERPL-MCNC: 0.73 MG/DL — SIGNIFICANT CHANGE UP (ref 0.5–1.3)
EGFR: 94 ML/MIN/1.73M2 — SIGNIFICANT CHANGE UP
GLUCOSE BLDC GLUCOMTR-MCNC: 102 MG/DL — HIGH (ref 70–99)
GLUCOSE BLDC GLUCOMTR-MCNC: 104 MG/DL — HIGH (ref 70–99)
GLUCOSE BLDC GLUCOMTR-MCNC: 119 MG/DL — HIGH (ref 70–99)
GLUCOSE BLDC GLUCOMTR-MCNC: 127 MG/DL — HIGH (ref 70–99)
GLUCOSE BLDC GLUCOMTR-MCNC: 96 MG/DL — SIGNIFICANT CHANGE UP (ref 70–99)
GLUCOSE SERPL-MCNC: 89 MG/DL — SIGNIFICANT CHANGE UP (ref 70–99)
HCT VFR BLD CALC: 31.4 % — LOW (ref 34.5–45)
HGB BLD-MCNC: 10 G/DL — LOW (ref 11.5–15.5)
MAGNESIUM SERPL-MCNC: 2.1 MG/DL — SIGNIFICANT CHANGE UP (ref 1.6–2.6)
MCHC RBC-ENTMCNC: 29.2 PG — SIGNIFICANT CHANGE UP (ref 27–34)
MCHC RBC-ENTMCNC: 31.8 GM/DL — LOW (ref 32–36)
MCV RBC AUTO: 91.5 FL — SIGNIFICANT CHANGE UP (ref 80–100)
NRBC # BLD: 0 /100 WBCS — SIGNIFICANT CHANGE UP (ref 0–0)
PHOSPHATE SERPL-MCNC: 4.4 MG/DL — SIGNIFICANT CHANGE UP (ref 2.5–4.5)
PLATELET # BLD AUTO: 414 K/UL — HIGH (ref 150–400)
POTASSIUM SERPL-MCNC: 4.6 MMOL/L — SIGNIFICANT CHANGE UP (ref 3.5–5.3)
POTASSIUM SERPL-SCNC: 4.6 MMOL/L — SIGNIFICANT CHANGE UP (ref 3.5–5.3)
RBC # BLD: 3.43 M/UL — LOW (ref 3.8–5.2)
RBC # FLD: 13.7 % — SIGNIFICANT CHANGE UP (ref 10.3–14.5)
SODIUM SERPL-SCNC: 141 MMOL/L — SIGNIFICANT CHANGE UP (ref 135–145)
TROPONIN T SERPL-MCNC: 0.01 NG/ML — SIGNIFICANT CHANGE UP (ref 0–0.01)
WBC # BLD: 7.17 K/UL — SIGNIFICANT CHANGE UP (ref 3.8–10.5)
WBC # FLD AUTO: 7.17 K/UL — SIGNIFICANT CHANGE UP (ref 3.8–10.5)

## 2023-01-15 PROCEDURE — 71045 X-RAY EXAM CHEST 1 VIEW: CPT | Mod: 26

## 2023-01-15 RX ORDER — FAMOTIDINE 10 MG/ML
20 INJECTION INTRAVENOUS AT BEDTIME
Refills: 0 | Status: DISCONTINUED | OUTPATIENT
Start: 2023-01-15 | End: 2023-01-18

## 2023-01-15 RX ORDER — SODIUM CHLORIDE 9 MG/ML
500 INJECTION, SOLUTION INTRAVENOUS ONCE
Refills: 0 | Status: COMPLETED | OUTPATIENT
Start: 2023-01-15 | End: 2023-01-15

## 2023-01-15 RX ADMIN — GABAPENTIN 300 MILLIGRAM(S): 400 CAPSULE ORAL at 06:45

## 2023-01-15 RX ADMIN — ENOXAPARIN SODIUM 40 MILLIGRAM(S): 100 INJECTION SUBCUTANEOUS at 18:51

## 2023-01-15 RX ADMIN — SENNA PLUS 2 TABLET(S): 8.6 TABLET ORAL at 21:51

## 2023-01-15 RX ADMIN — FAMOTIDINE 20 MILLIGRAM(S): 10 INJECTION INTRAVENOUS at 21:51

## 2023-01-15 RX ADMIN — CHLORHEXIDINE GLUCONATE 15 MILLILITER(S): 213 SOLUTION TOPICAL at 06:31

## 2023-01-15 RX ADMIN — PANTOPRAZOLE SODIUM 40 MILLIGRAM(S): 20 TABLET, DELAYED RELEASE ORAL at 11:28

## 2023-01-15 RX ADMIN — Medication 810 MILLIGRAM(S): at 00:19

## 2023-01-15 RX ADMIN — CHLORHEXIDINE GLUCONATE 15 MILLILITER(S): 213 SOLUTION TOPICAL at 18:50

## 2023-01-15 RX ADMIN — GABAPENTIN 300 MILLIGRAM(S): 400 CAPSULE ORAL at 18:57

## 2023-01-15 RX ADMIN — SODIUM CHLORIDE 1000 MILLILITER(S): 9 INJECTION, SOLUTION INTRAVENOUS at 21:41

## 2023-01-15 RX ADMIN — SODIUM CHLORIDE 1000 MILLILITER(S): 9 INJECTION, SOLUTION INTRAVENOUS at 04:07

## 2023-01-15 RX ADMIN — CHLORHEXIDINE GLUCONATE 1 APPLICATION(S): 213 SOLUTION TOPICAL at 11:28

## 2023-01-15 RX ADMIN — Medication 810 MILLIGRAM(S): at 00:59

## 2023-01-15 RX ADMIN — Medication 1 APPLICATION(S): at 06:32

## 2023-01-15 RX ADMIN — POLYETHYLENE GLYCOL 3350 17 GRAM(S): 17 POWDER, FOR SOLUTION ORAL at 11:28

## 2023-01-15 RX ADMIN — Medication 1 DROP(S): at 07:09

## 2023-01-15 NOTE — PROGRESS NOTE ADULT - SUBJECTIVE AND OBJECTIVE BOX
OTOLARYNGOLOGY (ENT) PROGRESS NOTE    PATIENT: WALI HOFFMANN  MRN: [id number]  : 64  [id number]  DATE OF SERVICE:  23  			         Subjective/ Interval:     : patient POD 1, intraoral incisions intact, neck is soft and flat, SS drainage from DC, cook signal strong, elastics in place   : : patient seen bedside intraoral incision intact, neck soft and flat, continues to have drainage form penrose, cook signal strong, elastics in place,  30 cc SS fluid   : NAEON. examined at bedside. penrose removed yesterday. doppler signal strong. MOSES 35cc SS. Mild pitting edema of left ankle, no leg pain.  1/15: tachy overnight to 110-120s. No chest pain or difficulty breathing.  systolic. CBC and BMP wnl. Given 500cc LR bolus. Otherwise no complaints. Pain well controlled. Tolerating CLD.     ALLERGIES:  No Known Drug Allergies  Nuts (Anaphylaxis)      MEDICATIONS:  Antiinfectives:     IV fluids:  dextrose 5%. 1000 milliLiter(s) IV Continuous <Continuous>  dextrose 5%. 1000 milliLiter(s) IV Continuous <Continuous>  potassium phosphate IVPB 15 milliMole(s) IV Intermittent once    Hematologic/Anticoagulation:  enoxaparin Injectable 40 milliGRAM(s) SubCutaneous every 24 hours    Pain medications/Neuro:  acetaminophen    Suspension .. 810 milliGRAM(s) Oral every 6 hours PRN  gabapentin Solution 300 milliGRAM(s) Oral two times a day    Endocrine Medications:   dextrose 50% Injectable 25 Gram(s) IV Push once  dextrose 50% Injectable 12.5 Gram(s) IV Push once  dextrose 50% Injectable 25 Gram(s) IV Push once  dextrose Oral Gel 15 Gram(s) Oral once PRN  glucagon  Injectable 1 milliGRAM(s) IntraMuscular once  insulin lispro (ADMELOG) corrective regimen sliding scale   SubCutaneous every 6 hours    All other standing medications:   bacitracin   Ointment 1 Application(s) Topical every 12 hours  chlorhexidine 0.12% Liquid 15 milliLiter(s) Oral Mucosa two times a day  chlorhexidine 2% Cloths 1 Application(s) Topical daily  pantoprazole  Injectable 40 milliGRAM(s) IV Push daily  polyethylene glycol 3350 17 Gram(s) Oral daily  senna 2 Tablet(s) Oral at bedtime    All other PRN medications:                          10.0   7.17  )-----------( 414      ( 15 Khurram 2023 04:30 )             31.4   ICU Vital Signs Last 24 Hrs  T(C): 37.2 (15 Khurram 2023 09:00), Max: 37.4 (2023 21:35)  T(F): 99 (15 Khurram 2023 09:00), Max: 99.3 (2023 21:35)  HR: 104 (15 Khurram 2023 08:39) (94 - 104)  BP: 130/66 (15 Khurram 2023 08:39) (109/57 - 136/64)  BP(mean): 86 (15 Khurram 2023 08:39) (78 - 90)  ABP: --  ABP(mean): --  RR: 18 (15 Khurram 2023 08:39) (17 - 18)  SpO2: 97% (15 Khurram 2023 08:39) (96% - 98%)    O2 Parameters below as of 15 Khurram 2023 08:39  Patient On (Oxygen Delivery Method): room air      PHYSICAL EXAM:  GEN: appears stated age, NAD  NEURO: alert & oriented x 4/4  HEENT: intraoral incisions intact, neck soft and flat. cook signal strong, elastics in place   CVS: regular rate and rhythm, no ectopy on monitor  Pulm: normal respiratory excursions, not tachypneic, no labored breathing, room air   Abd: non-distended  Ext: moving all four extremities, leg incision intact, 65 cc SS fluid in drain. Left ankle pitting edema, warm, no pain                           10.0   7.17  )-----------( 414      ( 15 Khurram 2023 04:30 )             31.4   -15    141  |  104  |  12  ----------------------------<  89  4.6   |  31  |  0.73    Ca    8.9      15 Khurram 2023 04:30  Phos  4.4     01-15  Mg     2.1     01-15

## 2023-01-15 NOTE — PROGRESS NOTE ADULT - ASSESSMENT
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    PLAN:     POD 3       - Flap checks Q4 x 48 hours      - Diet: Full liquid, bolus tube feeds      - PT consult - aim to walk 3-4x a day  with cam boot - will reorder CAM boot    - Continue Ondansetron PRN nausea/vomiting , PO Senna once daily, Miralax 17g once daily,  Chlorhexidine swish and spit, Esomeprazole, Enoxaparin, Gabapentin, Acetaminophen     - Continue incentive spirometry and SCD’s     - OOB to chair with CAM boot    [facility] at [phone] with any questions/concerns.

## 2023-01-16 LAB
ANION GAP SERPL CALC-SCNC: 8 MMOL/L — SIGNIFICANT CHANGE UP (ref 5–17)
BUN SERPL-MCNC: 12 MG/DL — SIGNIFICANT CHANGE UP (ref 7–23)
CALCIUM SERPL-MCNC: 9.1 MG/DL — SIGNIFICANT CHANGE UP (ref 8.4–10.5)
CHLORIDE SERPL-SCNC: 102 MMOL/L — SIGNIFICANT CHANGE UP (ref 96–108)
CO2 SERPL-SCNC: 28 MMOL/L — SIGNIFICANT CHANGE UP (ref 22–31)
CREAT SERPL-MCNC: 0.68 MG/DL — SIGNIFICANT CHANGE UP (ref 0.5–1.3)
EGFR: 100 ML/MIN/1.73M2 — SIGNIFICANT CHANGE UP
GLUCOSE BLDC GLUCOMTR-MCNC: 122 MG/DL — HIGH (ref 70–99)
GLUCOSE BLDC GLUCOMTR-MCNC: 124 MG/DL — HIGH (ref 70–99)
GLUCOSE BLDC GLUCOMTR-MCNC: 145 MG/DL — HIGH (ref 70–99)
GLUCOSE SERPL-MCNC: 123 MG/DL — HIGH (ref 70–99)
HCT VFR BLD CALC: 33.9 % — LOW (ref 34.5–45)
HGB BLD-MCNC: 10.8 G/DL — LOW (ref 11.5–15.5)
MAGNESIUM SERPL-MCNC: 2 MG/DL — SIGNIFICANT CHANGE UP (ref 1.6–2.6)
MCHC RBC-ENTMCNC: 29.4 PG — SIGNIFICANT CHANGE UP (ref 27–34)
MCHC RBC-ENTMCNC: 31.9 GM/DL — LOW (ref 32–36)
MCV RBC AUTO: 92.4 FL — SIGNIFICANT CHANGE UP (ref 80–100)
NRBC # BLD: 0 /100 WBCS — SIGNIFICANT CHANGE UP (ref 0–0)
PHOSPHATE SERPL-MCNC: 3.8 MG/DL — SIGNIFICANT CHANGE UP (ref 2.5–4.5)
PLATELET # BLD AUTO: 432 K/UL — HIGH (ref 150–400)
POTASSIUM SERPL-MCNC: 4.7 MMOL/L — SIGNIFICANT CHANGE UP (ref 3.5–5.3)
POTASSIUM SERPL-SCNC: 4.7 MMOL/L — SIGNIFICANT CHANGE UP (ref 3.5–5.3)
RBC # BLD: 3.67 M/UL — LOW (ref 3.8–5.2)
RBC # FLD: 13.5 % — SIGNIFICANT CHANGE UP (ref 10.3–14.5)
SODIUM SERPL-SCNC: 138 MMOL/L — SIGNIFICANT CHANGE UP (ref 135–145)
WBC # BLD: 7.59 K/UL — SIGNIFICANT CHANGE UP (ref 3.8–10.5)
WBC # FLD AUTO: 7.59 K/UL — SIGNIFICANT CHANGE UP (ref 3.8–10.5)

## 2023-01-16 PROCEDURE — 93970 EXTREMITY STUDY: CPT | Mod: 26

## 2023-01-16 PROCEDURE — 99222 1ST HOSP IP/OBS MODERATE 55: CPT

## 2023-01-16 PROCEDURE — 71275 CT ANGIOGRAPHY CHEST: CPT | Mod: 26

## 2023-01-16 RX ORDER — APIXABAN 2.5 MG/1
10 TABLET, FILM COATED ORAL EVERY 12 HOURS
Refills: 0 | Status: DISCONTINUED | OUTPATIENT
Start: 2023-01-16 | End: 2023-01-18

## 2023-01-16 RX ORDER — DIPHENHYDRAMINE HCL 50 MG
50 CAPSULE ORAL ONCE
Refills: 0 | Status: COMPLETED | OUTPATIENT
Start: 2023-01-16 | End: 2023-01-16

## 2023-01-16 RX ORDER — DEXAMETHASONE 0.5 MG/5ML
10 ELIXIR ORAL ONCE
Refills: 0 | Status: COMPLETED | OUTPATIENT
Start: 2023-01-16 | End: 2023-01-16

## 2023-01-16 RX ADMIN — Medication 1 APPLICATION(S): at 06:31

## 2023-01-16 RX ADMIN — Medication 1 DROP(S): at 17:04

## 2023-01-16 RX ADMIN — CHLORHEXIDINE GLUCONATE 15 MILLILITER(S): 213 SOLUTION TOPICAL at 17:03

## 2023-01-16 RX ADMIN — Medication 1 APPLICATION(S): at 17:03

## 2023-01-16 RX ADMIN — APIXABAN 10 MILLIGRAM(S): 2.5 TABLET, FILM COATED ORAL at 17:03

## 2023-01-16 RX ADMIN — FAMOTIDINE 20 MILLIGRAM(S): 10 INJECTION INTRAVENOUS at 21:51

## 2023-01-16 RX ADMIN — OXYCODONE HYDROCHLORIDE 5 MILLIGRAM(S): 5 TABLET ORAL at 16:09

## 2023-01-16 RX ADMIN — POLYETHYLENE GLYCOL 3350 17 GRAM(S): 17 POWDER, FOR SOLUTION ORAL at 11:11

## 2023-01-16 RX ADMIN — Medication 1 DROP(S): at 06:30

## 2023-01-16 RX ADMIN — CHLORHEXIDINE GLUCONATE 15 MILLILITER(S): 213 SOLUTION TOPICAL at 06:30

## 2023-01-16 RX ADMIN — PANTOPRAZOLE SODIUM 40 MILLIGRAM(S): 20 TABLET, DELAYED RELEASE ORAL at 11:11

## 2023-01-16 RX ADMIN — SENNA PLUS 2 TABLET(S): 8.6 TABLET ORAL at 21:51

## 2023-01-16 RX ADMIN — GABAPENTIN 300 MILLIGRAM(S): 400 CAPSULE ORAL at 17:08

## 2023-01-16 RX ADMIN — CHLORHEXIDINE GLUCONATE 1 APPLICATION(S): 213 SOLUTION TOPICAL at 11:11

## 2023-01-16 RX ADMIN — Medication 102 MILLIGRAM(S): at 04:10

## 2023-01-16 RX ADMIN — Medication 50 MILLIGRAM(S): at 02:30

## 2023-01-16 RX ADMIN — OXYCODONE HYDROCHLORIDE 5 MILLIGRAM(S): 5 TABLET ORAL at 15:09

## 2023-01-16 NOTE — PROVIDER CONTACT NOTE (OTHER) - ASSESSMENT
VSS as per EMR. Patient had no complaints of SOB or chest pain, but felt "heartburn" in the chest.
Pt's HR has sustained tachycardia for the past 45 minutes, HR highest to 125. Pt Is currently resting in bed with lights off and eyes closed. Pt is asymptomatic. Denies chest pain, SOB. HR currently 120, last /63, satting 97%, RR 18.

## 2023-01-16 NOTE — CONSULT NOTE ADULT - ATTENDING COMMENTS
#DVT - provoked in kimberly-op period, CT-PE negative; rec eliquis x 3 months    #sinus tachycardia - unclear etiology at this time --  can check TSH/FT4 to rule out thyroid abnormality; maintain euvolemic, adequate pain control;  otherwise rectal temp normal, CT-PE negative, Hg stable, patient does not appear in significant pain or other distress, unlikely that DVT alone causing  Continue to monitor on telemetry    Med Consult will continue to follow
Pt seen post op with SICU team. Awake and in no distress. Comfortable on NC with ETCO2 of 36. Pt has doppler in place with strong signal. Pain control.

## 2023-01-16 NOTE — CONSULT NOTE ADULT - SUBJECTIVE AND OBJECTIVE BOX
Med Consult Note  ==============================================================================================================  HPI: 62y Female presented with recent diagnosis of left posterior mandible ameloblastoma, now s/p left mandibulectomy, neck exploration, implants, left FFF on 01/11/23. Patient has mild swelling in the left face no numbness although the occasional sharp tingling shooting pain in the left side but otherwise has intact cranial nerve exam she does note swelling which is externally visible on the left body and ramus of the mandible.    Med consulted for sinus tachycardia and new DVT.     Pmhx: HLD, GERD (11 Jan 2023 07:31)      PAST MEDICAL & SURGICAL HISTORY:  GERD (Gastroesophageal Reflux Disease) (ICD9 530.81)  Ameloblastoma of mandible  H/O: Myomectomy (ICD9 V45.89)  S/P Tonsillectomy (ICD9 V45.89)  History of ovarian cyst b/l  Disorder of gums  removal x 10 years    Allergies: Allergies  No Known Drug Allergies  Nuts (Anaphylaxis)    Intolerances      Soc:   Advanced Directives: Presumed Full Code     CURRENT MEDICATIONS:   --------------------------------------------------------------------------------------  Neurologic Medications  acetaminophen    Suspension .. 810 milliGRAM(s) Oral every 6 hours PRN Moderate Pain (4 - 6)  gabapentin Solution 300 milliGRAM(s) Oral two times a day  oxyCODONE    Solution 5 milliGRAM(s) Oral every 6 hours PRN Moderate Pain (4 - 6)    Respiratory Medications    Cardiovascular Medications    Gastrointestinal Medications  dextrose 5%. 1000 milliLiter(s) IV Continuous <Continuous>  dextrose 5%. 1000 milliLiter(s) IV Continuous <Continuous>  famotidine    Tablet 20 milliGRAM(s) Oral at bedtime  pantoprazole  Injectable 40 milliGRAM(s) IV Push daily  polyethylene glycol 3350 17 Gram(s) Oral daily  senna 2 Tablet(s) Oral at bedtime    Genitourinary Medications    Hematologic/Oncologic Medications  apixaban 10 milliGRAM(s) Oral every 12 hours    Antimicrobial/Immunologic Medications    Endocrine/Metabolic Medications  dextrose 50% Injectable 25 Gram(s) IV Push once  dextrose 50% Injectable 12.5 Gram(s) IV Push once  dextrose 50% Injectable 25 Gram(s) IV Push once  dextrose Oral Gel 15 Gram(s) Oral once PRN Blood Glucose LESS THAN 70 milliGRAM(s)/deciliter  glucagon  Injectable 1 milliGRAM(s) IntraMuscular once  insulin lispro (ADMELOG) corrective regimen sliding scale   SubCutaneous every 6 hours    Topical/Other Medications  artificial tears (preservative free) Ophthalmic Solution 1 Drop(s) Both EYES two times a day  bacitracin   Ointment 1 Application(s) Topical every 12 hours  chlorhexidine 0.12% Liquid 15 milliLiter(s) Oral Mucosa two times a day  chlorhexidine 2% Cloths 1 Application(s) Topical daily    --------------------------------------------------------------------------------------    VITAL SIGNS, INS/OUTS (last 24 hours):  --------------------------------------------------------------------------------------  ICU Vital Signs Last 24 Hrs  T(C): 36.9 (16 Jan 2023 13:51), Max: 37.6 (16 Jan 2023 05:00)  T(F): 98.4 (16 Jan 2023 13:51), Max: 99.6 (16 Jan 2023 05:00)  HR: 110 (16 Jan 2023 11:51) (94 - 128)  BP: 118/72 (16 Jan 2023 11:51) (114/63 - 157/58)  BP(mean): 91 (16 Jan 2023 11:51) (81 - 101)  ABP: --  ABP(mean): --  RR: 18 (16 Jan 2023 11:51) (17 - 18)  SpO2: 97% (16 Jan 2023 11:51) (94% - 98%)    O2 Parameters below as of 16 Jan 2023 11:51  Patient On (Oxygen Delivery Method): room air          I&O's Summary    15 Khurram 2023 07:01  -  16 Jan 2023 07:00  --------------------------------------------------------  IN: 2345 mL / OUT: 3690 mL / NET: -1345 mL    16 Jan 2023 07:01  -  16 Jan 2023 14:26  --------------------------------------------------------  IN: 815 mL / OUT: 1200 mL / NET: -385 mL      --------------------------------------------------------------------------------------    EXAM:  GENERAL: NAD, lying in bed comfortably  HEAD:  Atraumatic, Normocephalic  EYES: EOMI, PERRLA, conjunctiva and sclera clear  ENT: Moist mucous membranes  NECK: Supple, No JVD  CHEST/LUNG: Clear to auscultation bilaterally; No rales, rhonchi, wheezing, or rubs. Unlabored respirations  HEART: Regular rate and rhythm; No murmurs, rubs, or gallops  ABDOMEN: BSx4; Soft, nontender, nondistended.   EXTREMITIES:  2+ Peripheral Pulses, brisk capillary refill. No clubbing, cyanosis, or edema  NERVOUS SYSTEM:  A&Ox3, no focal deficits   SKIN: No rashes or lesions  Surgical sites - Left mandible c/d/i and left fibula with drain in place      LABS  --------------------------------------------------------------------------------------  Labs:  CAPILLARY BLOOD GLUCOSE      POCT Blood Glucose.: 145 mg/dL (16 Jan 2023 11:35)  POCT Blood Glucose.: 122 mg/dL (16 Jan 2023 06:28)  POCT Blood Glucose.: 102 mg/dL (15 Khurram 2023 23:45)  POCT Blood Glucose.: 119 mg/dL (15 Khurram 2023 17:28)                          10.8   7.59  )-----------( 432      ( 16 Jan 2023 05:30 )             33.9         01-16    138  |  102  |  12  ----------------------------<  123<H>  4.7   |  28  |  0.68      Calcium, Total Serum: 9.1 mg/dL (01-16-23 @ 05:30)    IMAGING RESULTS  Normal compressibility of the LEFT common femoral, femoral and popliteal   veins.  Acute deep venous thrombosis of thepaired calf veins (posterior tibial   and/or anterior tibial and deep intramuscular calf vein (soleus muscle)   on the LEFT.    No DVT on the right.    ASSESSMENT/ PLAN:  62y Female with recent diagnosis of left posterior mandible ameloblastoma, now s/p left mandibulectomy, neck exploration, implants, left FFF on 01/11/23. Patient has mild swelling in the left face no numbness although the occasional sharp tingling shooting pain in the left side but otherwise has intact cranial nerve exam she does note swelling which is externally visible on the left body and ramus of the mandible.    Medicine consulted for sinus tachycardia and new DVT in left lower extremity.    -Low concern for infection/fever causing tachycardia given no WBC elevation, no new SOB or respiratory symptoms, no signs of surgical site infection, no systemic infection symptoms (malaise, nausea/vomiting/diarrhea)  -Sinus tachycardia is likely 2/2 DVT  -Would recommend starting full dose anticoagulation - 10mg Apixaban BID for 10 days following by 5mg BID for three months  -No need for hypercoagulability workup given no family history and this is provoked DVT after surgery   Med Consult Note  ==============================================================================================================  HPI: 62y Female presented with recent diagnosis of left posterior mandible ameloblastoma, now s/p left mandibulectomy, neck exploration, implants, left FFF on 01/11/23.     Med consulted for sinus tachycardia and new DVT.     Pmhx: HLD, GERD (11 Jan 2023 07:31)      PAST MEDICAL & SURGICAL HISTORY:  GERD (Gastroesophageal Reflux Disease) (ICD9 530.81)  Ameloblastoma of mandible  H/O: Myomectomy (ICD9 V45.89)  S/P Tonsillectomy (ICD9 V45.89)  History of ovarian cyst b/l  Disorder of gums  removal x 10 years    Allergies: Allergies  No Known Drug Allergies  Nuts (Anaphylaxis)    Intolerances      Soc:   Advanced Directives: Presumed Full Code     CURRENT MEDICATIONS:   --------------------------------------------------------------------------------------  Neurologic Medications  acetaminophen    Suspension .. 810 milliGRAM(s) Oral every 6 hours PRN Moderate Pain (4 - 6)  gabapentin Solution 300 milliGRAM(s) Oral two times a day  oxyCODONE    Solution 5 milliGRAM(s) Oral every 6 hours PRN Moderate Pain (4 - 6)    Respiratory Medications    Cardiovascular Medications    Gastrointestinal Medications  dextrose 5%. 1000 milliLiter(s) IV Continuous <Continuous>  dextrose 5%. 1000 milliLiter(s) IV Continuous <Continuous>  famotidine    Tablet 20 milliGRAM(s) Oral at bedtime  pantoprazole  Injectable 40 milliGRAM(s) IV Push daily  polyethylene glycol 3350 17 Gram(s) Oral daily  senna 2 Tablet(s) Oral at bedtime    Genitourinary Medications    Hematologic/Oncologic Medications  apixaban 10 milliGRAM(s) Oral every 12 hours    Antimicrobial/Immunologic Medications    Endocrine/Metabolic Medications  dextrose 50% Injectable 25 Gram(s) IV Push once  dextrose 50% Injectable 12.5 Gram(s) IV Push once  dextrose 50% Injectable 25 Gram(s) IV Push once  dextrose Oral Gel 15 Gram(s) Oral once PRN Blood Glucose LESS THAN 70 milliGRAM(s)/deciliter  glucagon  Injectable 1 milliGRAM(s) IntraMuscular once  insulin lispro (ADMELOG) corrective regimen sliding scale   SubCutaneous every 6 hours    Topical/Other Medications  artificial tears (preservative free) Ophthalmic Solution 1 Drop(s) Both EYES two times a day  bacitracin   Ointment 1 Application(s) Topical every 12 hours  chlorhexidine 0.12% Liquid 15 milliLiter(s) Oral Mucosa two times a day  chlorhexidine 2% Cloths 1 Application(s) Topical daily    --------------------------------------------------------------------------------------    VITAL SIGNS, INS/OUTS (last 24 hours):  --------------------------------------------------------------------------------------  ICU Vital Signs Last 24 Hrs  T(C): 36.9 (16 Jan 2023 13:51), Max: 37.6 (16 Jan 2023 05:00)  T(F): 98.4 (16 Jan 2023 13:51), Max: 99.6 (16 Jan 2023 05:00)  HR: 110 (16 Jan 2023 11:51) (94 - 128)  BP: 118/72 (16 Jan 2023 11:51) (114/63 - 157/58)  BP(mean): 91 (16 Jan 2023 11:51) (81 - 101)  ABP: --  ABP(mean): --  RR: 18 (16 Jan 2023 11:51) (17 - 18)  SpO2: 97% (16 Jan 2023 11:51) (94% - 98%)    O2 Parameters below as of 16 Jan 2023 11:51  Patient On (Oxygen Delivery Method): room air          I&O's Summary    15 Khurram 2023 07:01  -  16 Jan 2023 07:00  --------------------------------------------------------  IN: 2345 mL / OUT: 3690 mL / NET: -1345 mL    16 Jan 2023 07:01  -  16 Jan 2023 14:26  --------------------------------------------------------  IN: 815 mL / OUT: 1200 mL / NET: -385 mL      --------------------------------------------------------------------------------------    EXAM:  GENERAL: NAD, lying in bed comfortably  HEAD:  Atraumatic, Normocephalic  EYES: EOMI, PERRLA, conjunctiva and sclera clear  ENT: Moist mucous membranes  NECK: Supple, No JVD  CHEST/LUNG: Clear to auscultation bilaterally; No rales, rhonchi, wheezing, or rubs. Unlabored respirations  HEART: Regular rate and rhythm; No murmurs, rubs, or gallops  ABDOMEN: BSx4; Soft, nontender, nondistended.   EXTREMITIES:  2+ Peripheral Pulses, brisk capillary refill. No clubbing, cyanosis, or edema  NERVOUS SYSTEM:  A&Ox3, no focal deficits   SKIN: No rashes or lesions  Surgical sites - Left mandible c/d/i and left fibula with drain in place      LABS  --------------------------------------------------------------------------------------  Labs:  CAPILLARY BLOOD GLUCOSE      POCT Blood Glucose.: 145 mg/dL (16 Jan 2023 11:35)  POCT Blood Glucose.: 122 mg/dL (16 Jan 2023 06:28)  POCT Blood Glucose.: 102 mg/dL (15 Khurram 2023 23:45)  POCT Blood Glucose.: 119 mg/dL (15 Khurram 2023 17:28)                          10.8   7.59  )-----------( 432      ( 16 Jan 2023 05:30 )             33.9         01-16    138  |  102  |  12  ----------------------------<  123<H>  4.7   |  28  |  0.68      Calcium, Total Serum: 9.1 mg/dL (01-16-23 @ 05:30)    IMAGING RESULTS  Normal compressibility of the LEFT common femoral, femoral and popliteal   veins.  Acute deep venous thrombosis of thepaired calf veins (posterior tibial   and/or anterior tibial and deep intramuscular calf vein (soleus muscle)   on the LEFT.    No DVT on the right.    ASSESSMENT/ PLAN:  62y Female with recent diagnosis of left posterior mandible ameloblastoma, now s/p left mandibulectomy, neck exploration, implants, left FFF on 01/11/23. Medicine consulted for sinus tachycardia and new DVT in left lower extremity.    -Low concern for infection/fever causing tachycardia given no WBC elevation, no new SOB or respiratory symptoms, no signs of surgical site infection, no systemic infection symptoms (malaise, nausea/vomiting/diarrhea), but would recommend rectal temperature to ensure no fever is present  -Sinus tachycardia is likely 2/2 DVT  -Would recommend starting full dose anticoagulation - 10mg Apixaban BID for 10 days following by 5mg BID for three months  -No need for hypercoagulability workup given no family history and this is provoked DVT after surgery   Med Consult Note  ==============================================================================================================  HPI: 62y Female presented with recent diagnosis of left posterior mandible ameloblastoma, now s/p left mandibulectomy, neck exploration, implants, left FFF on 01/11/23. Still with NG tube in place, but starting to tolerate some PO. Ambulating some with PT.   Patient started to have sinus tachycardia on 1/14. Asymptomatic.  Denies fever, SOB, dyuria, diarrhea, pain.    Med consulted for sinus tachycardia and new DVT.     Pmhx: HLD, GERD (11 Jan 2023 07:31)      PAST MEDICAL & SURGICAL HISTORY:  GERD (Gastroesophageal Reflux Disease) (ICD9 530.81)  Ameloblastoma of mandible  H/O: Myomectomy (ICD9 V45.89)  S/P Tonsillectomy (ICD9 V45.89)  History of ovarian cyst b/l  Disorder of gums  removal x 10 years    Allergies: Allergies  No Known Drug Allergies  Nuts (Anaphylaxis)    Soc:   Advanced Directives: Presumed Full Code     CURRENT MEDICATIONS:   --------------------------------------------------------------------------------------  Neurologic Medications  acetaminophen    Suspension .. 810 milliGRAM(s) Oral every 6 hours PRN Moderate Pain (4 - 6)  gabapentin Solution 300 milliGRAM(s) Oral two times a day  oxyCODONE    Solution 5 milliGRAM(s) Oral every 6 hours PRN Moderate Pain (4 - 6)    Respiratory Medications    Cardiovascular Medications    Gastrointestinal Medications  dextrose 5%. 1000 milliLiter(s) IV Continuous <Continuous>  dextrose 5%. 1000 milliLiter(s) IV Continuous <Continuous>  famotidine    Tablet 20 milliGRAM(s) Oral at bedtime  pantoprazole  Injectable 40 milliGRAM(s) IV Push daily  polyethylene glycol 3350 17 Gram(s) Oral daily  senna 2 Tablet(s) Oral at bedtime    Genitourinary Medications    Hematologic/Oncologic Medications  apixaban 10 milliGRAM(s) Oral every 12 hours    Antimicrobial/Immunologic Medications    Endocrine/Metabolic Medications  dextrose 50% Injectable 25 Gram(s) IV Push once  dextrose 50% Injectable 12.5 Gram(s) IV Push once  dextrose 50% Injectable 25 Gram(s) IV Push once  dextrose Oral Gel 15 Gram(s) Oral once PRN Blood Glucose LESS THAN 70 milliGRAM(s)/deciliter  glucagon  Injectable 1 milliGRAM(s) IntraMuscular once  insulin lispro (ADMELOG) corrective regimen sliding scale   SubCutaneous every 6 hours    Topical/Other Medications  artificial tears (preservative free) Ophthalmic Solution 1 Drop(s) Both EYES two times a day  bacitracin   Ointment 1 Application(s) Topical every 12 hours  chlorhexidine 0.12% Liquid 15 milliLiter(s) Oral Mucosa two times a day  chlorhexidine 2% Cloths 1 Application(s) Topical daily    --------------------------------------------------------------------------------------    VITAL SIGNS, INS/OUTS (last 24 hours):  --------------------------------------------------------------------------------------  ICU Vital Signs Last 24 Hrs  T(C): 36.9 (16 Jan 2023 13:51), Max: 37.6 (16 Jan 2023 05:00)  T(F): 98.4 (16 Jan 2023 13:51), Max: 99.6 (16 Jan 2023 05:00)  HR: 110 (16 Jan 2023 11:51) (94 - 128)  BP: 118/72 (16 Jan 2023 11:51) (114/63 - 157/58)  BP(mean): 91 (16 Jan 2023 11:51) (81 - 101)  ABP: --  ABP(mean): --  RR: 18 (16 Jan 2023 11:51) (17 - 18)  SpO2: 97% (16 Jan 2023 11:51) (94% - 98%)    O2 Parameters below as of 16 Jan 2023 11:51  Patient On (Oxygen Delivery Method): room air          I&O's Summary    15 Khurram 2023 07:01  -  16 Jan 2023 07:00  --------------------------------------------------------  IN: 2345 mL / OUT: 3690 mL / NET: -1345 mL    16 Jan 2023 07:01  -  16 Jan 2023 14:26  --------------------------------------------------------  IN: 815 mL / OUT: 1200 mL / NET: -385 mL      --------------------------------------------------------------------------------------    EXAM:  GENERAL: NAD, lying in bed comfortably  HEAD:  Atraumatic, Normocephalic  EYES: EOMI, PERRLA, conjunctiva and sclera clear  ENT: Moist mucous membranes  NECK: Supple, No JVD  CHEST/LUNG: Clear to auscultation bilaterally; No rales, rhonchi, wheezing, or rubs. Unlabored respirations  HEART: Regular rate and rhythm; No murmurs, rubs, or gallops  ABDOMEN: BSx4; Soft, nontender, nondistended.   EXTREMITIES:  2+ Peripheral Pulses, brisk capillary refill. No clubbing, cyanosis, or edema  NERVOUS SYSTEM:  A&Ox3, no focal deficits   SKIN: No rashes or lesions  Surgical sites - Left mandible c/d/i and left fibula with drain in place. Some erythema and edema surrounding both surgical sites.       LABS  --------------------------------------------------------------------------------------  Labs:  CAPILLARY BLOOD GLUCOSE      POCT Blood Glucose.: 145 mg/dL (16 Jan 2023 11:35)  POCT Blood Glucose.: 122 mg/dL (16 Jan 2023 06:28)  POCT Blood Glucose.: 102 mg/dL (15 Khurram 2023 23:45)  POCT Blood Glucose.: 119 mg/dL (15 Khurram 2023 17:28)                          10.8   7.59  )-----------( 432      ( 16 Jan 2023 05:30 )             33.9         01-16    138  |  102  |  12  ----------------------------<  123<H>  4.7   |  28  |  0.68      Calcium, Total Serum: 9.1 mg/dL (01-16-23 @ 05:30)    IMAGING RESULTS  Normal compressibility of the LEFT common femoral, femoral and popliteal   veins.  Acute deep venous thrombosis of the paired calf veins (posterior tibial   and/or anterior tibial and deep intramuscular calf vein (soleus muscle)   on the LEFT.    No DVT on the right.    ASSESSMENT/ PLAN:  62y Female with recent diagnosis of left posterior mandible ameloblastoma, now s/p left mandibulectomy, neck exploration, implants, left FFF on 01/11/23. Medicine consulted for sinus tachycardia and new DVT in left lower extremity.    -Low concern for infection/fever causing tachycardia given no WBC elevation, no new SOB or respiratory symptoms, no signs of surgical site infection, no systemic infection symptoms (malaise, nausea/vomiting/diarrhea), but would recommend rectal temperature to ensure no fever is present. If fever is present, would recommend fever workup (CXR, blood cultures, urine cultures)  -Sinus tachycardia is likely 2/2 DVT causing PE   -Send BNP to eval for right heart strain  -Would recommend starting full dose anticoagulation - 10mg Apixaban BID for 10 days following by 5mg BID for three months  -No need for hypercoagulability workup given no family history and this is provoked DVT after surgery

## 2023-01-16 NOTE — PROVIDER CONTACT NOTE (OTHER) - ACTION/TREATMENT ORDERED:
STAT trops sent, ekg done. Chest XR and duplex ordered.
MD Staton made aware of sustained tachycardia. MD alerted RN that full cardiac workup was completed during day shift. MD will order 500 mL LR bolus. Will continue to monitor HR

## 2023-01-16 NOTE — PROVIDER CONTACT NOTE (OTHER) - BACKGROUND
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 now s/p L segmental mandibulectomy L FFF, L neck exploration
Pt is a 61 yo Female s/p left segmental mandibulectomy, LFFF, L neck exploration, MOSES leg, neck penrose on 1/11/23.

## 2023-01-16 NOTE — PROGRESS NOTE ADULT - ASSESSMENT
WALI HOFFMANN is a 62W with hx left post mandible ameloblastoma s/p left mandibulectomy, neck exploration, implants, left FFF on 01/11/23. Recent tachycardia to 110s-120s, LES positive for bilateral peritoneal DVTs, and left below knee DVT.    PLAN:     POD 5  - Medicine consult   - Therapeutic coagulation for lower extremity DVTs  - Continue flap checks q4 hours for 24 hours    - Diet: Full liquid, bolus tube feeds    - PT consult - aim to walk 3-4x a day  with cam boot - CAM boot reordered  - Continue Ondansetron PRN nausea/vomiting , PO Senna once daily, Miralax 17g once daily,  Chlorhexidine swish and spit, Esomeprazole, Enoxaparin, Gabapentin, Acetaminophen   - Continue incentive spirometry and SCD’s   - OOB to chair with CAM boot     WALI HOFFMANN is a 62W with hx left post mandible ameloblastoma s/p left mandibulectomy, neck exploration, implants, left FFF on 01/11/23. Recent tachycardia to 110s-120s, follow up LES doppler read.    PLAN:     POD 5  - Medicine consult   - Follow up LES read  - Continue flap checks q4 hours for 24 hours    - Diet: Full liquid, bolus tube feeds    - PT consult - aim to walk 3-4x a day  with cam boot - CAM boot reordered  - Continue Ondansetron PRN nausea/vomiting , PO Senna once daily, Miralax 17g once daily,  Chlorhexidine swish and spit, Esomeprazole, Enoxaparin, Gabapentin, Acetaminophen   - Continue incentive spirometry and SCD’s   - OOB to chair with CAM boot

## 2023-01-16 NOTE — PROGRESS NOTE ADULT - SUBJECTIVE AND OBJECTIVE BOX
OTOLARYNGOLOGY (ENT) PROGRESS NOTE    PATIENT: WALI HOFFMANN  MRN: [id number]  : 64  [id number]  DATE OF SERVICE:  23  			         ID:WALI HOFFMANN is a 62W hx left post mandible ameloblastoma s/p left mandibulectomy, neck exploration, implants, left FFF on 23.    Subjective/ Interval:   : patient POD 1, intraoral incisions intact, neck is soft and flat, SS drainage from RI, cook signal strong, elastics in place   : : patient seen bedside intraoral incision intact, neck soft and flat, continues to have drainage form penrose, cook signal strong, elastics in place,  30 cc SS fluid   : NAEON. examined at bedside. penrose removed yesterday. doppler signal strong. MOSES 35cc SS. Mild pitting edema of left ankle, no leg pain.  1/15: tachy overnight to 110-120s. No chest pain or difficulty breathing.  systolic. CBC and BMP wnl. Given 500cc LR bolus. Otherwise no complaints. Pain well controlled. Tolerating CLD.   : Patient was seen and examined on AM rounds. Patient continues to be tachycardic to 100s-110s. Doppler signal remains strong. Patient denies chest pain or difficulty breathing. CXR, EKG, troponins are all within normal limits. Pending lower extremity ultrasound today. Denies leg pain. MOSES 40cc.     ALLERGIES:  No Known Drug Allergies  Nuts (Anaphylaxis)      MEDICATIONS:  Antiinfectives:     IV fluids:  dextrose 5%. 1000 milliLiter(s) IV Continuous <Continuous>  dextrose 5%. 1000 milliLiter(s) IV Continuous <Continuous>    Hematologic/Anticoagulation:  enoxaparin Injectable 40 milliGRAM(s) SubCutaneous every 24 hours    Pain medications/Neuro:  acetaminophen    Suspension .. 810 milliGRAM(s) Oral every 6 hours PRN  gabapentin Solution 300 milliGRAM(s) Oral two times a day  oxyCODONE    Solution 5 milliGRAM(s) Oral every 6 hours PRN    Endocrine Medications:   dextrose 50% Injectable 25 Gram(s) IV Push once  dextrose 50% Injectable 12.5 Gram(s) IV Push once  dextrose 50% Injectable 25 Gram(s) IV Push once  dextrose Oral Gel 15 Gram(s) Oral once PRN  glucagon  Injectable 1 milliGRAM(s) IntraMuscular once  insulin lispro (ADMELOG) corrective regimen sliding scale   SubCutaneous every 6 hours    All other standing medications:   artificial tears (preservative free) Ophthalmic Solution 1 Drop(s) Both EYES two times a day  bacitracin   Ointment 1 Application(s) Topical every 12 hours  chlorhexidine 0.12% Liquid 15 milliLiter(s) Oral Mucosa two times a day  chlorhexidine 2% Cloths 1 Application(s) Topical daily  famotidine    Tablet 20 milliGRAM(s) Oral at bedtime  pantoprazole  Injectable 40 milliGRAM(s) IV Push daily  polyethylene glycol 3350 17 Gram(s) Oral daily  senna 2 Tablet(s) Oral at bedtime    All other PRN medications:    Vital Signs Last 24 Hrs  T(C): 36.5 (2023 09:14), Max: 37.6 (2023 05:00)  T(F): 97.7 (2023 09:14), Max: 99.6 (2023 05:00)  HR: 128 (2023 09:27) (94 - 128)  BP: 117/63 (2023 09:27) (114/63 - 157/58)  BP(mean): 82 (2023 09:27) (81 - 101)  RR: 18 (2023 09:27) (17 - 18)  SpO2: 96% (2023 09:27) (94% - 98%)    Parameters below as of 2023 09:27  Patient On (Oxygen Delivery Method): room air          01-15 @ 07: @ 07:00  --------------------------------------------------------  IN:    Enteral Tube Flush: 600 mL    Jevity 1.2: 945 mL    Lactated Ringers Bolus: 500 mL    Oral Fluid: 300 mL  Total IN: 2345 mL    OUT:    Bulb (mL): 40 mL    Voided (mL): 3650 mL  Total OUT: 3690 mL    Total NET: -1345 mL       @ 07:  -   @ 10:58  --------------------------------------------------------  IN:    Enteral Tube Flush: 200 mL    Jevity 1.2: 135 mL  Total IN: 335 mL    OUT:    Voided (mL): 600 mL  Total OUT: 600 mL    Total NET: -265 mL          01-15-23 @ 07:01  -  23 @ 07:00  --------------------------------------------------------  IN:  Total IN: 0 mL    OUT:    Bulb (mL): 40 mL  Total OUT: 40 mL    Total NET: -40 mL              PHYSICAL EXAM:  GEN: appears stated age, NAD  NEURO: alert & oriented x 4/  HEENT: intraoral incisions intact, neck soft and flat. cook signal strong, elastics in place   Neck: Incision clean, dry, intact; soft, flat.  CVS: sinus tachycardia, no ectopy on monitor  Pulm: normal respiratory excursions, not tachypneic, no labored breathing, room air   Abd: non-distended  Ext: moving all four extremities, leg incision intact, 40 cc SS fluid in drain. Left ankle pitting edema resolved; pulses intact          LABS                       10.8   7.59  )-----------( 432      ( 2023 05:30 )             33.9        138  |  102  |  12  ----------------------------<  123<H>  4.7   |  28  |  0.68    Ca    9.1      2023 05:30  Phos  3.8       Mg     2.0                Coagulation Studies-       Endocrine Panel-  Calcium, Total Serum: 9.1 mg/dL ( @ 05:30)

## 2023-01-17 LAB
ANION GAP SERPL CALC-SCNC: 11 MMOL/L — SIGNIFICANT CHANGE UP (ref 5–17)
BUN SERPL-MCNC: 12 MG/DL — SIGNIFICANT CHANGE UP (ref 7–23)
CALCIUM SERPL-MCNC: 9.2 MG/DL — SIGNIFICANT CHANGE UP (ref 8.4–10.5)
CHLORIDE SERPL-SCNC: 100 MMOL/L — SIGNIFICANT CHANGE UP (ref 96–108)
CO2 SERPL-SCNC: 26 MMOL/L — SIGNIFICANT CHANGE UP (ref 22–31)
CREAT SERPL-MCNC: 0.67 MG/DL — SIGNIFICANT CHANGE UP (ref 0.5–1.3)
EGFR: 100 ML/MIN/1.73M2 — SIGNIFICANT CHANGE UP
GLUCOSE BLDC GLUCOMTR-MCNC: 101 MG/DL — HIGH (ref 70–99)
GLUCOSE BLDC GLUCOMTR-MCNC: 103 MG/DL — HIGH (ref 70–99)
GLUCOSE BLDC GLUCOMTR-MCNC: 115 MG/DL — HIGH (ref 70–99)
GLUCOSE BLDC GLUCOMTR-MCNC: 115 MG/DL — HIGH (ref 70–99)
GLUCOSE BLDC GLUCOMTR-MCNC: 153 MG/DL — HIGH (ref 70–99)
GLUCOSE SERPL-MCNC: 107 MG/DL — HIGH (ref 70–99)
HCT VFR BLD CALC: 31.9 % — LOW (ref 34.5–45)
HGB BLD-MCNC: 10.4 G/DL — LOW (ref 11.5–15.5)
MAGNESIUM SERPL-MCNC: 2.1 MG/DL — SIGNIFICANT CHANGE UP (ref 1.6–2.6)
MCHC RBC-ENTMCNC: 29.5 PG — SIGNIFICANT CHANGE UP (ref 27–34)
MCHC RBC-ENTMCNC: 32.6 GM/DL — SIGNIFICANT CHANGE UP (ref 32–36)
MCV RBC AUTO: 90.6 FL — SIGNIFICANT CHANGE UP (ref 80–100)
NRBC # BLD: 0 /100 WBCS — SIGNIFICANT CHANGE UP (ref 0–0)
NT-PROBNP SERPL-SCNC: 19 PG/ML — SIGNIFICANT CHANGE UP (ref 0–300)
PHOSPHATE SERPL-MCNC: 4.3 MG/DL — SIGNIFICANT CHANGE UP (ref 2.5–4.5)
PLATELET # BLD AUTO: 417 K/UL — HIGH (ref 150–400)
POTASSIUM SERPL-MCNC: 4.5 MMOL/L — SIGNIFICANT CHANGE UP (ref 3.5–5.3)
POTASSIUM SERPL-SCNC: 4.5 MMOL/L — SIGNIFICANT CHANGE UP (ref 3.5–5.3)
RBC # BLD: 3.52 M/UL — LOW (ref 3.8–5.2)
RBC # FLD: 13.8 % — SIGNIFICANT CHANGE UP (ref 10.3–14.5)
SODIUM SERPL-SCNC: 137 MMOL/L — SIGNIFICANT CHANGE UP (ref 135–145)
WBC # BLD: 6.7 K/UL — SIGNIFICANT CHANGE UP (ref 3.8–10.5)
WBC # FLD AUTO: 6.7 K/UL — SIGNIFICANT CHANGE UP (ref 3.8–10.5)

## 2023-01-17 PROCEDURE — 99232 SBSQ HOSP IP/OBS MODERATE 35: CPT | Mod: GC

## 2023-01-17 RX ADMIN — CHLORHEXIDINE GLUCONATE 15 MILLILITER(S): 213 SOLUTION TOPICAL at 06:08

## 2023-01-17 RX ADMIN — Medication 1 DROP(S): at 17:51

## 2023-01-17 RX ADMIN — Medication 810 MILLIGRAM(S): at 07:04

## 2023-01-17 RX ADMIN — SENNA PLUS 2 TABLET(S): 8.6 TABLET ORAL at 22:04

## 2023-01-17 RX ADMIN — Medication 1 APPLICATION(S): at 17:52

## 2023-01-17 RX ADMIN — Medication 2: at 17:56

## 2023-01-17 RX ADMIN — PANTOPRAZOLE SODIUM 40 MILLIGRAM(S): 20 TABLET, DELAYED RELEASE ORAL at 11:36

## 2023-01-17 RX ADMIN — APIXABAN 10 MILLIGRAM(S): 2.5 TABLET, FILM COATED ORAL at 06:14

## 2023-01-17 RX ADMIN — GABAPENTIN 300 MILLIGRAM(S): 400 CAPSULE ORAL at 18:06

## 2023-01-17 RX ADMIN — POLYETHYLENE GLYCOL 3350 17 GRAM(S): 17 POWDER, FOR SOLUTION ORAL at 11:37

## 2023-01-17 RX ADMIN — FAMOTIDINE 20 MILLIGRAM(S): 10 INJECTION INTRAVENOUS at 22:04

## 2023-01-17 RX ADMIN — APIXABAN 10 MILLIGRAM(S): 2.5 TABLET, FILM COATED ORAL at 17:51

## 2023-01-17 RX ADMIN — Medication 1 APPLICATION(S): at 06:08

## 2023-01-17 RX ADMIN — Medication 810 MILLIGRAM(S): at 06:06

## 2023-01-17 RX ADMIN — CHLORHEXIDINE GLUCONATE 1 APPLICATION(S): 213 SOLUTION TOPICAL at 11:37

## 2023-01-17 RX ADMIN — Medication 1 DROP(S): at 06:07

## 2023-01-17 NOTE — PROGRESS NOTE ADULT - SUBJECTIVE AND OBJECTIVE BOX
OVERNIGHT EVENTS:  As per nurse and patient, no o/n events, patient resting comfortably.    INTERVAL HPI:   Patient was seen and examined at bedside. No complaints at this time. feeling much improved. Has been OOB. Denies any dyspnea or cough. Denies palpitations or chest pain. Patient denies: fever, chills, dizziness, weakness, HA, Changes in vision, CP, palpitations, SOB, cough, N/V/D/C, dysuria, changes in bowel movements, LE edema. ROS otherwise negative.    VITAL SIGNS:  T(F): 98.6 (01-17-23 @ 09:13)  HR: 84 (01-17-23 @ 11:41)  BP: 124/65 (01-17-23 @ 11:41)  RR: 18 (01-17-23 @ 11:41)  SpO2: 99% (01-17-23 @ 11:41)  Wt(kg): --    PHYSICAL EXAM:    Constitutional:, NAD  HEENT: PERRL, EOMI, sclera non-icteric, neck supple,   Respiratory: CTA b/l, good air entry b/l,   Cardiovascular: RRR, normal S1S2, no M/R/G  Gastrointestinal: soft, NTND, no masses palpable, BS normal  Extremities: LLE in immobilizer- Warm, well perfused, pulses equal bilateral upper and lower extremities, no edema,  Neurological: AAOx3, CN Grossly intact  Skin: Normal temperature, warm, dry    MEDICATIONS  (STANDING):  apixaban 10 milliGRAM(s) Oral every 12 hours  artificial tears (preservative free) Ophthalmic Solution 1 Drop(s) Both EYES two times a day  bacitracin   Ointment 1 Application(s) Topical every 12 hours  chlorhexidine 0.12% Liquid 15 milliLiter(s) Oral Mucosa two times a day  chlorhexidine 2% Cloths 1 Application(s) Topical daily  dextrose 5%. 1000 milliLiter(s) (100 mL/Hr) IV Continuous <Continuous>  dextrose 5%. 1000 milliLiter(s) (50 mL/Hr) IV Continuous <Continuous>  dextrose 50% Injectable 25 Gram(s) IV Push once  dextrose 50% Injectable 12.5 Gram(s) IV Push once  dextrose 50% Injectable 25 Gram(s) IV Push once  famotidine    Tablet 20 milliGRAM(s) Oral at bedtime  gabapentin Solution 300 milliGRAM(s) Oral two times a day  glucagon  Injectable 1 milliGRAM(s) IntraMuscular once  insulin lispro (ADMELOG) corrective regimen sliding scale   SubCutaneous every 6 hours  pantoprazole  Injectable 40 milliGRAM(s) IV Push daily  polyethylene glycol 3350 17 Gram(s) Oral daily  senna 2 Tablet(s) Oral at bedtime    MEDICATIONS  (PRN):  acetaminophen    Suspension .. 810 milliGRAM(s) Oral every 6 hours PRN Moderate Pain (4 - 6)  dextrose Oral Gel 15 Gram(s) Oral once PRN Blood Glucose LESS THAN 70 milliGRAM(s)/deciliter  oxyCODONE    Solution 5 milliGRAM(s) Oral every 6 hours PRN Moderate Pain (4 - 6)      Allergies    No Known Drug Allergies  Nuts (Anaphylaxis)    Intolerances        LABS:                        10.4   6.70  )-----------( 417      ( 17 Jan 2023 05:30 )             31.9     01-17    137  |  100  |  12  ----------------------------<  107<H>  4.5   |  26  |  0.67    Ca    9.2      17 Jan 2023 05:30  Phos  4.3     01-17  Mg     2.1     01-17              RADIOLOGY & ADDITIONAL TESTS:  Reviewed

## 2023-01-17 NOTE — PROGRESS NOTE ADULT - ATTENDING COMMENTS
62y Female with recent diagnosis of left posterior mandible ameloblastoma, now s/p left mandibulectomy, neck exploration, implants, left FFF on 01/11/23. Medicine consulted for sinus tachycardia and new DVT in left lower extremity ( below knees multiple vs)    - pt seen and examined with Dr. Helms, seen sitting on bedside chair,  saturating well on room air speaking full sentence, sinus on tele -heart rate now mostly in 80-90s ( much improved )  eomi, perrl  RRR, no murmur  good air entry bilaterally  she has high boot on left lower leg, ambulatory with the boot.   no edema noted on right lower ext  neuro - aao x 3 non focal.    labs/imaging reviewed    - post op acute DVT left lower leg/below knee, multiple vs- High risk for thromboemobolism, low risk of bleeding, anticoagulation favors over serial ultrasound exam -grade 2c (Chest)   agree with continuing Elqiuis 10 mg po bid for 7 days then reduced dose to 5 mg po bid for 3 months  to get baseline d-dimer  we educated pt regarding risk of bleeding.     -tachycardia -much improved, heart rate around 80-90 , control of pain.    thank you for allowing us to participate in the care, med consult will follow. 62y Female with recent diagnosis of left posterior mandible ameloblastoma, now s/p left mandibulectomy, neck exploration, implants, left FFF on 01/11/23. Medicine consulted for sinus tachycardia and new DVT in left lower extremity ( below knees multiple vs)    - pt seen and examined with Dr. Helms, seen sitting on bedside chair,  saturating well on room air speaking full sentence, sinus on tele -heart rate now mostly in 80-90s ( much improved )  eomi, perrl  RRR, no murmur  good air entry bilaterally  she has high boot on left lower leg, ambulatory with the boot.   no edema noted on right lower ext  neuro - aao x 3 non focal.    labs/imaging reviewed    - post op acute DVT left lower leg/below knee, multiple vs- High risk for thromboemobolism, low risk of bleeding, anticoagulation favors over serial ultrasound exam -grade 2c (Chest)   agree with continuing Elqiuis 10 mg po bid for 7 days then reduced dose to 5 mg po bid for 3 months  to get baseline d-dimer  we educated pt regarding risk of bleeding.     -tachycardia -much improved, heart rate around 80-90 , control of pain.  - normocytic anemia -hb stable  - reactive thrombocytosis     thank you for allowing us to participate in the care, med consult will follow.

## 2023-01-17 NOTE — PROGRESS NOTE ADULT - ASSESSMENT
Assessment and Plan:  WALI HOFFMANN is a 62W with hx left post mandible ameloblastoma s/p left mandibulectomy, neck exploration, implants, left FFF on 01/11/23. Recent tachycardia to 110s-120s, over the weekend, patient found to have a DVT, started on eliquis.   PLAN:     -  Continue medical recs   - Follow up LES read  - q4 fflap checks end this afternoon   - Diet: Full liquid- NGT removed today   - PT consult - aim to walk 3-4x a day  with cam boot - CAM boot reordered  - Continue Ondansetron PRN nausea/vomiting , PO Senna once daily, Miralax 17g once daily,  Chlorhexidine swish and spit, Esomeprazole, Enoxaparin, Gabapentin, Acetaminophen   - Continue incentive spirometry and SCD’s   - OOB to chair with CAM boot- however patient can still ambulate with current camboot before it arrives     [facility] at [phone] with any questions/concerns.    Stacy Motley PA-C  01-17-23 @ 09:43

## 2023-01-17 NOTE — CHART NOTE - NSCHARTNOTEFT_GEN_A_CORE
Admitting Diagnosis:   Patient is a 62y old  Female who presents with a chief complaint of Ameloblastoma (13 Jan 2023 07:01)    PAST MEDICAL & SURGICAL HISTORY:  GERD (Gastroesophageal Reflux Disease) (ICD9 530.81)  Ameloblastoma of mandible  H/O: Myomectomy (ICD9 V45.89)  S/P Tonsillectomy (ICD9 V45.89)  History of ovarian cyst  b/l  Disorder of gums  removal x 10 years    Current Nutrition Order:  Low Na FLD      PO Intake: Good (%) [   ]  Fair (50-75%) [ x  ] Poor (<25%) [   ]    GI Issues:   WDL, last BM 1/17  NGT removed 1/17  No n/v/d/c  No abd distention or discomfort noted    Pain:  No pain noted at this time    Skin Integrity:  Surgical incision, yahaira score 20  No edema noted  No pressure ulcers noted    Labs:   01-17    137  |  100  |  12  ----------------------------<  107<H>  4.5   |  26  |  0.67    Ca    9.2      17 Jan 2023 05:30  Phos  4.3     01-17  Mg     2.1     01-17    CAPILLARY BLOOD GLUCOSE    POCT Blood Glucose.: 103 mg/dL (17 Jan 2023 11:40)  POCT Blood Glucose.: 115 mg/dL (17 Jan 2023 06:30)  POCT Blood Glucose.: 115 mg/dL (17 Jan 2023 00:04)  POCT Blood Glucose.: 124 mg/dL (16 Jan 2023 17:23)    Medications:  MEDICATIONS  (STANDING):  apixaban 10 milliGRAM(s) Oral every 12 hours  artificial tears (preservative free) Ophthalmic Solution 1 Drop(s) Both EYES two times a day  bacitracin   Ointment 1 Application(s) Topical every 12 hours  chlorhexidine 0.12% Liquid 15 milliLiter(s) Oral Mucosa two times a day  chlorhexidine 2% Cloths 1 Application(s) Topical daily  dextrose 5%. 1000 milliLiter(s) (100 mL/Hr) IV Continuous <Continuous>  dextrose 5%. 1000 milliLiter(s) (50 mL/Hr) IV Continuous <Continuous>  dextrose 50% Injectable 25 Gram(s) IV Push once  dextrose 50% Injectable 12.5 Gram(s) IV Push once  dextrose 50% Injectable 25 Gram(s) IV Push once  famotidine    Tablet 20 milliGRAM(s) Oral at bedtime  gabapentin Solution 300 milliGRAM(s) Oral two times a day  glucagon  Injectable 1 milliGRAM(s) IntraMuscular once  insulin lispro (ADMELOG) corrective regimen sliding scale   SubCutaneous every 6 hours  pantoprazole  Injectable 40 milliGRAM(s) IV Push daily  polyethylene glycol 3350 17 Gram(s) Oral daily  senna 2 Tablet(s) Oral at bedtime    MEDICATIONS  (PRN):  acetaminophen    Suspension .. 810 milliGRAM(s) Oral every 6 hours PRN Moderate Pain (4 - 6)  dextrose Oral Gel 15 Gram(s) Oral once PRN Blood Glucose LESS THAN 70 milliGRAM(s)/deciliter  oxyCODONE    Solution 5 milliGRAM(s) Oral every 6 hours PRN Moderate Pain (4 - 6)    Admitting Anthropometrics:  Height for BMI (FEET)	5 Feet  Height for BMI (INCHES)	0 Inch(s)  Height for BMI (CENTIMETERS)	152.4 Centimeter(s)  Weight for BMI (lbs)	119 lb  Weight for BMI (kg)	54 kg  Body Mass Index	23.2    Weight Change:   No new weights obtained during this admission, please cont to trend weight biweekly.     Nutrition Focused Physical Exam: Completed [   ]  Not Pertinent [   ]    Estimated energy needs:   IBW used for calculations as pt >100% of IBW in critical care setting, adjusted for post-op, age  Kcal (30-35 kcal/kg): 3429-2276 kcal  Protein (1.2-1.4 g/kg pro): 54-63g pro  Fluids (30-35 ml/kg): 4206-6280 ml    Subjective:   62F presents with recent diagnosis of left posterior mandible ameloblastoma presenting today for Left mandibular composite resection, exploration of left neck for microvascular anastomosis, reconstruction with left Fibula free flap, placement of dental implants 1/11. On assessment, pt resting in bed. Currently on Low Na FLD- pt had yet to have a meal this am, RD to follow to assess tolerance. NGT now removed this am by ENT. No n/v/d/c. Last BM 1/17. To optimize wound healing- would recommend addition of Ensure Enlive BID (700 kcal, 40g protein, 360 mL free H2O). RD provided edu on importance of adequate PO intake with emphasis on lean protein to support wound healing post-op. RD to follow.     Previous Nutrition Diagnosis: Inadequate Oral Intake RT s/p mandibulectomy AEB need for alternate means of nutrition/hydration    Active [   ]  Resolved [ x  ]    If resolved, new PES: Increased kcal, pro needs RT wound healing AEB s/p Left mandibular composite resection, exploration of left neck for microvascular anastomosis, reconstruction with left Fibula free flap, placement of dental implants     Goal:  Pt to meet >75% estimated nutrition needs consistently via tolerated route.    Recommendations:  1. Low Na FLD   >> Recommend advance diet per SLP's recommendations for least restrictive safest diet consistency  2. Recommend addition of Ensure Enlive BID (700 kcal, 40g protein, 360 mL free H2O)  3. Pain and bowel regimen per team   4. Cont to monitor lytes and replete prn   5. RD diet edu prn    Education:   Cont to encourage PO intake with emphasis on lean protein to support wound healing    Risk Level: High [ x  ] Moderate [   ] Low [   ]

## 2023-01-17 NOTE — PROGRESS NOTE ADULT - SUBJECTIVE AND OBJECTIVE BOX
OTOLARYNGOLOGY (ENT) PROGRESS NOTE    PATIENT: WALI HOFFMANN  MRN: [id number]  : 64  [id number]  DATE OF SERVICE:  23  			           ID:WALI HOFFMANN is a 62W hx left post mandible ameloblastoma s/p left mandibulectomy, neck exploration, implants, left FFF on 23.    Subjective/ Interval:   : patient POD 1, intraoral incisions intact, neck is soft and flat, SS drainage from NE, cook signal strong, elastics in place   : : patient seen bedside intraoral incision intact, neck soft and flat, continues to have drainage form penrose, cook signal strong, elastics in place,  30 cc SS fluid   : NAEON. examined at bedside. penrose removed yesterday. doppler signal strong. MOSES 35cc SS. Mild pitting edema of left ankle, no leg pain.  1/15: tachy overnight to 110-120s. No chest pain or difficulty breathing.  systolic. CBC and BMP wnl. Given 500cc LR bolus. Otherwise no complaints. Pain well controlled. Tolerating CLD.   : Patient was seen and examined on AM rounds. Patient continues to be tachycardic to 100s-110s. Doppler signal remains strong. Patient denies chest pain or difficulty breathing. CXR, EKG, troponins are all within normal limits. Pending lower extremity ultrasound today. Denies leg pain. MOSES 40cc.   : patient seen this morning, AVSS, doppler signal strong, flap soft and flat, MOSES drain removed form leg, NGT removed, continue eliquis     ALLERGIES:  No Known Drug Allergies  Nuts (Anaphylaxis)      MEDICATIONS:  Antiinfectives:     IV fluids:  dextrose 5%. 1000 milliLiter(s) IV Continuous <Continuous>  dextrose 5%. 1000 milliLiter(s) IV Continuous <Continuous>    Hematologic/Anticoagulation:  apixaban 10 milliGRAM(s) Oral every 12 hours    Pain medications/Neuro:  acetaminophen    Suspension .. 810 milliGRAM(s) Oral every 6 hours PRN  gabapentin Solution 300 milliGRAM(s) Oral two times a day  oxyCODONE    Solution 5 milliGRAM(s) Oral every 6 hours PRN    Endocrine Medications:   dextrose 50% Injectable 25 Gram(s) IV Push once  dextrose 50% Injectable 12.5 Gram(s) IV Push once  dextrose 50% Injectable 25 Gram(s) IV Push once  dextrose Oral Gel 15 Gram(s) Oral once PRN  glucagon  Injectable 1 milliGRAM(s) IntraMuscular once  insulin lispro (ADMELOG) corrective regimen sliding scale   SubCutaneous every 6 hours    All other standing medications:   artificial tears (preservative free) Ophthalmic Solution 1 Drop(s) Both EYES two times a day  bacitracin   Ointment 1 Application(s) Topical every 12 hours  chlorhexidine 0.12% Liquid 15 milliLiter(s) Oral Mucosa two times a day  chlorhexidine 2% Cloths 1 Application(s) Topical daily  famotidine    Tablet 20 milliGRAM(s) Oral at bedtime  pantoprazole  Injectable 40 milliGRAM(s) IV Push daily  polyethylene glycol 3350 17 Gram(s) Oral daily  senna 2 Tablet(s) Oral at bedtime    All other PRN medications:    Vital Signs Last 24 Hrs  T(C): 37 (2023 09:13), Max: 37.2 (2023 17:24)  T(F): 98.6 (2023 09:13), Max: 98.9 (2023 17:24)  HR: 96 (2023 08:55) (82 - 110)  BP: 119/60 (2023 08:55) (117/78 - 146/82)  BP(mean): 85 (2023 08:55) (85 - 107)  RR: 18 (2023 08:55) (17 - 19)  SpO2: 99% (2023 08:55) (95% - 100%)    Parameters below as of 2023 08:55  Patient On (Oxygen Delivery Method): room air           @ 07:01  -  -17 @ 07:00  --------------------------------------------------------  IN:    Enteral Tube Flush: 200 mL    Jevity 1.2: 135 mL    Oral Fluid: 480 mL  Total IN: 815 mL    OUT:    Bulb (mL): 20 mL    Voided (mL): 2650 mL  Total OUT: 2670 mL    Total NET: -1855 mL          23 @ 07:01  -  23 @ 07:00  --------------------------------------------------------  IN:  Total IN: 0 mL    OUT:    Bulb (mL): 20 mL  Total OUT: 20 mL    Total NET: -20 mL    PHYSICAL EXAM:  GEN: appears stated age, NAD  NEURO: alert & oriented x   HEENT: intraoral incisions intact, neck soft and flat. Donews signal strong, elastics in place  Neck: Incision clean, dry, intact; soft, flat.  CVS: sinus tachycardia, no ectopy on monitor  Pulm: normal respiratory excursions, not tachypneic, no labored breathing, room air   Abd: non-distended  Ext: moving all four extremities, leg incision intact,leg MOSES drain REMOVED, . Left ankle pitting edema resolved; pulses intact       LABS                       10.4   6.70  )-----------( 417      ( 2023 05:30 )             31.9        137  |  100  |  12  ----------------------------<  107<H>  4.5   |  26  |  0.67    Ca    9.2      2023 05:30  Phos  4.3       Mg     2.1                Endocrine Panel-  Calcium, Total Serum: 9.2 mg/dL ( @ 05:30)

## 2023-01-17 NOTE — PROGRESS NOTE ADULT - ASSESSMENT
62y Female with recent diagnosis of left posterior mandible ameloblastoma, now s/p left mandibulectomy, neck exploration, implants, left FFF on 01/11/23. Medicine consulted for sinus tachycardia and new DVT in left lower extremity.    #Sinus tachycardia- resolving   -Low concern for infection/fever causing tachycardia given no WBC elevation, no new SOB or respiratory symptoms, no signs of surgical site infection, no systemic infection symptoms (malaise, nausea/vomiting/diarrhea)  -Sinus tachycardia could be multifactorial (pain from surgery, deconditioning after immobilization and surgery)  - CT angio negative for PE, patient saturating well on RA     # Acute LLE provoked DVT, below the knee  - US reveals LLE DVT in posterior tibial, anterior tibial and deep intramuscular calf   - provoked dvt in setting of recent surgery and immobilization (and malignancy)  - Below the knee DVT but high risk due to: immobilization malignancy, inpatient, dvt in multiple veins --> grade 2C recommendation to start AC vs screening with US     Recommend:   -Continue 10mg Apixaban BID for 10 days (started on 01/16) following by 5mg BID for three months  -No need for hypercoagulability workup given no family history and this is provoked DVT after surgery  - Needs close follow up with PCP to for continuation of therapy   - if obtaining labs in AM, can consider getting d-dimer as some providers use d-dimer to trend when considering continuing or discontinuing AC in the future         Plan discussed with attending physician, medicine will continue to follow.

## 2023-01-18 ENCOUNTER — TRANSCRIPTION ENCOUNTER (OUTPATIENT)
Age: 63
End: 2023-01-18

## 2023-01-18 VITALS — TEMPERATURE: 99 F

## 2023-01-18 LAB
ANION GAP SERPL CALC-SCNC: 11 MMOL/L — SIGNIFICANT CHANGE UP (ref 5–17)
BUN SERPL-MCNC: 13 MG/DL — SIGNIFICANT CHANGE UP (ref 7–23)
CALCIUM SERPL-MCNC: 8.7 MG/DL — SIGNIFICANT CHANGE UP (ref 8.4–10.5)
CHLORIDE SERPL-SCNC: 101 MMOL/L — SIGNIFICANT CHANGE UP (ref 96–108)
CO2 SERPL-SCNC: 26 MMOL/L — SIGNIFICANT CHANGE UP (ref 22–31)
CREAT SERPL-MCNC: 0.82 MG/DL — SIGNIFICANT CHANGE UP (ref 0.5–1.3)
EGFR: 82 ML/MIN/1.73M2 — SIGNIFICANT CHANGE UP
GLUCOSE BLDC GLUCOMTR-MCNC: 112 MG/DL — HIGH (ref 70–99)
GLUCOSE BLDC GLUCOMTR-MCNC: 130 MG/DL — HIGH (ref 70–99)
GLUCOSE SERPL-MCNC: 99 MG/DL — SIGNIFICANT CHANGE UP (ref 70–99)
HCT VFR BLD CALC: 33.1 % — LOW (ref 34.5–45)
HGB BLD-MCNC: 10.5 G/DL — LOW (ref 11.5–15.5)
MAGNESIUM SERPL-MCNC: 2.2 MG/DL — SIGNIFICANT CHANGE UP (ref 1.6–2.6)
MCHC RBC-ENTMCNC: 29.4 PG — SIGNIFICANT CHANGE UP (ref 27–34)
MCHC RBC-ENTMCNC: 31.7 GM/DL — LOW (ref 32–36)
MCV RBC AUTO: 92.7 FL — SIGNIFICANT CHANGE UP (ref 80–100)
NRBC # BLD: 0 /100 WBCS — SIGNIFICANT CHANGE UP (ref 0–0)
PHOSPHATE SERPL-MCNC: 4.2 MG/DL — SIGNIFICANT CHANGE UP (ref 2.5–4.5)
PLATELET # BLD AUTO: 409 K/UL — HIGH (ref 150–400)
POTASSIUM SERPL-MCNC: 4.3 MMOL/L — SIGNIFICANT CHANGE UP (ref 3.5–5.3)
POTASSIUM SERPL-SCNC: 4.3 MMOL/L — SIGNIFICANT CHANGE UP (ref 3.5–5.3)
RBC # BLD: 3.57 M/UL — LOW (ref 3.8–5.2)
RBC # FLD: 13.6 % — SIGNIFICANT CHANGE UP (ref 10.3–14.5)
SODIUM SERPL-SCNC: 138 MMOL/L — SIGNIFICANT CHANGE UP (ref 135–145)
WBC # BLD: 7.54 K/UL — SIGNIFICANT CHANGE UP (ref 3.8–10.5)
WBC # FLD AUTO: 7.54 K/UL — SIGNIFICANT CHANGE UP (ref 3.8–10.5)

## 2023-01-18 PROCEDURE — 84443 ASSAY THYROID STIM HORMONE: CPT

## 2023-01-18 PROCEDURE — C9353: CPT

## 2023-01-18 PROCEDURE — 86901 BLOOD TYPING SEROLOGIC RH(D): CPT

## 2023-01-18 PROCEDURE — 85027 COMPLETE CBC AUTOMATED: CPT

## 2023-01-18 PROCEDURE — 82962 GLUCOSE BLOOD TEST: CPT

## 2023-01-18 PROCEDURE — 36415 COLL VENOUS BLD VENIPUNCTURE: CPT

## 2023-01-18 PROCEDURE — 71045 X-RAY EXAM CHEST 1 VIEW: CPT

## 2023-01-18 PROCEDURE — 84484 ASSAY OF TROPONIN QUANT: CPT

## 2023-01-18 PROCEDURE — 86900 BLOOD TYPING SEROLOGIC ABO: CPT

## 2023-01-18 PROCEDURE — 71275 CT ANGIOGRAPHY CHEST: CPT

## 2023-01-18 PROCEDURE — C1713: CPT

## 2023-01-18 PROCEDURE — 84100 ASSAY OF PHOSPHORUS: CPT

## 2023-01-18 PROCEDURE — 83880 ASSAY OF NATRIURETIC PEPTIDE: CPT

## 2023-01-18 PROCEDURE — 88311 DECALCIFY TISSUE: CPT

## 2023-01-18 PROCEDURE — 93970 EXTREMITY STUDY: CPT

## 2023-01-18 PROCEDURE — 86850 RBC ANTIBODY SCREEN: CPT

## 2023-01-18 PROCEDURE — 99232 SBSQ HOSP IP/OBS MODERATE 35: CPT | Mod: GC

## 2023-01-18 PROCEDURE — 88309 TISSUE EXAM BY PATHOLOGIST: CPT

## 2023-01-18 PROCEDURE — 97116 GAIT TRAINING THERAPY: CPT

## 2023-01-18 PROCEDURE — C1889: CPT

## 2023-01-18 PROCEDURE — 83735 ASSAY OF MAGNESIUM: CPT

## 2023-01-18 PROCEDURE — 80048 BASIC METABOLIC PNL TOTAL CA: CPT

## 2023-01-18 PROCEDURE — 97162 PT EVAL MOD COMPLEX 30 MIN: CPT

## 2023-01-18 PROCEDURE — 83036 HEMOGLOBIN GLYCOSYLATED A1C: CPT

## 2023-01-18 RX ORDER — BACITRACIN ZINC 500 UNIT/G
1 OINTMENT IN PACKET (EA) TOPICAL
Qty: 1 | Refills: 0
Start: 2023-01-18 | End: 2023-01-24

## 2023-01-18 RX ORDER — POLYETHYLENE GLYCOL 3350 17 G/17G
17 POWDER, FOR SOLUTION ORAL
Qty: 0 | Refills: 0 | DISCHARGE
Start: 2023-01-18

## 2023-01-18 RX ORDER — FAMOTIDINE 10 MG/ML
1 INJECTION INTRAVENOUS
Qty: 7 | Refills: 0
Start: 2023-01-18 | End: 2023-01-24

## 2023-01-18 RX ORDER — APIXABAN 2.5 MG/1
2 TABLET, FILM COATED ORAL
Qty: 360 | Refills: 0
Start: 2023-01-18 | End: 2023-04-17

## 2023-01-18 RX ORDER — OXYCODONE HYDROCHLORIDE 5 MG/1
5 TABLET ORAL
Qty: 80 | Refills: 0
Start: 2023-01-18 | End: 2023-01-21

## 2023-01-18 RX ORDER — CHLORHEXIDINE GLUCONATE 213 G/1000ML
15 SOLUTION TOPICAL
Qty: 210 | Refills: 0
Start: 2023-01-18 | End: 2023-01-24

## 2023-01-18 RX ORDER — SENNA PLUS 8.6 MG/1
2 TABLET ORAL
Qty: 0 | Refills: 0 | DISCHARGE
Start: 2023-01-18

## 2023-01-18 RX ORDER — ACETAMINOPHEN 500 MG
25.31 TABLET ORAL
Qty: 0 | Refills: 0 | DISCHARGE
Start: 2023-01-18

## 2023-01-18 RX ADMIN — APIXABAN 10 MILLIGRAM(S): 2.5 TABLET, FILM COATED ORAL at 06:19

## 2023-01-18 RX ADMIN — CHLORHEXIDINE GLUCONATE 15 MILLILITER(S): 213 SOLUTION TOPICAL at 06:19

## 2023-01-18 RX ADMIN — Medication 1 APPLICATION(S): at 06:19

## 2023-01-18 RX ADMIN — POLYETHYLENE GLYCOL 3350 17 GRAM(S): 17 POWDER, FOR SOLUTION ORAL at 12:17

## 2023-01-18 RX ADMIN — Medication 810 MILLIGRAM(S): at 06:19

## 2023-01-18 RX ADMIN — Medication 810 MILLIGRAM(S): at 08:08

## 2023-01-18 RX ADMIN — PANTOPRAZOLE SODIUM 40 MILLIGRAM(S): 20 TABLET, DELAYED RELEASE ORAL at 12:18

## 2023-01-18 NOTE — PROGRESS NOTE ADULT - ATTENDING COMMENTS
62y Female with recent diagnosis of left posterior mandible ameloblastoma, now s/p left mandibulectomy, neck exploration, implants, left FFF on 01/11/23. Medicine consulted for sinus tachycardia and new DVT in left lower extremity ( below knees multiple vs)    - pt seen and examined with Dr. Helms, seen sitting on bedside chair,  remain stable,   had cam boot on left leg, going home today.   eomi, perrl  RRR, no murmur  good air entry bilaterally  cam boot on left leg  no edema noted on right lower ext  neuro - aao x 3 non focal.    labs/imaging reviewed    - post op acute DVT left lower leg/below knee, multiple vs involved,  High risk for thromboemobolism, low risk of bleeding, anticoagulation favors over serial ultrasound exam -grade 2c (Chest recommendation)  agree with continuing Elqiuis 10 mg po bid for 7 days then reduced dose to 5 mg po bid for 3 months  to get baseline d-dimer  we educated pt regarding risk of bleeding.     -tachycardia -much improved, heart rate around 80-90 , control of pain.  - normocytic anemia -hb stable  - reactive thrombocytosis     stable from medical standpoint for discharge per primary team. she need follow up with pmd  thank you for allowing us to participate in the care,

## 2023-01-18 NOTE — DISCHARGE NOTE NURSING/CASE MANAGEMENT/SOCIAL WORK - PATIENT PORTAL LINK FT
You can access the FollowMyHealth Patient Portal offered by [facility] by registering at the following website: [email or web address]. By joining [email or web address]’s FollowMyHealth portal, you will also be able to view your health information using other applications (apps) compatible with our system.

## 2023-01-18 NOTE — DISCHARGE NOTE NURSING/CASE MANAGEMENT/SOCIAL WORK - NSDCPEFALRISK_GEN_ALL_CORE
For information on Fall & Injury Prevention, visit: [email or web address] OR  [email or web address] OR  https://www.cdc.gov/steadi/patient.html

## 2023-01-18 NOTE — DISCHARGE NOTE PROVIDER - NSDCMRMEDTOKEN_GEN_ALL_CORE_FT
acetaminophen 160 mg/5 mL oral suspension: 25.31 milliliter(s) orally every 6 hours, As needed, Moderate Pain (4 - 6)  apixaban 5 mg oral tablet: 2 tab(s) orally every 12 hours for 10 days, than 1 tablet every 12 hours for 3 months   bacitracin 500 units/g topical ointment: 1 application topically every 12 hours  Cam boot: Please use cam boot when ambulating    chlorhexidine 0.12% mucous membrane liquid: 15 milliliter(s) mucous membrane 2 times a day  famotidine 20 mg oral tablet: 1 tab(s) orally once a day (at bedtime)  ocular lubricant ophthalmic solution: 1 drop(s) to each affected eye 2 times a day  oxyCODONE 5 mg/5 mL oral solution: 5 milliliter(s) orally every 6 hours, As needed, Moderate Pain (4 - 6) MDD:20  polyethylene glycol 3350 oral powder for reconstitution: 17 gram(s) orally once a day  senna leaf extract oral tablet: 2 tab(s) orally once a day (at bedtime)

## 2023-01-18 NOTE — DISCHARGE NOTE PROVIDER - NSDCCPCAREPLAN_GEN_ALL_CORE_FT
PRINCIPAL DISCHARGE DIAGNOSIS  Diagnosis: Ameloblastoma of mandible  Assessment and Plan of Treatment:

## 2023-01-18 NOTE — PROGRESS NOTE ADULT - ASSESSMENT
62y Female with recent diagnosis of left posterior mandible ameloblastoma, now s/p left mandibulectomy, neck exploration, implants, left FFF on 01/11/23. Medicine consulted for sinus tachycardia and new DVT in left lower extremity.    #Sinus tachycardia- resolving   -Low concern for infection/fever causing tachycardia given no WBC elevation, no new SOB or respiratory symptoms, no signs of surgical site infection, no systemic infection symptoms (malaise, nausea/vomiting/diarrhea)  -Sinus tachycardia could be multifactorial (pain from surgery, deconditioning after immobilization and surgery)  - CT angio negative for PE, patient saturating well on RA     # Acute LLE provoked DVT, below the knee  - US reveals LLE DVT in posterior tibial, anterior tibial and deep intramuscular calf   - provoked dvt in setting of recent surgery and immobilization (and malignancy)  - Below the knee DVT but high risk due to: immobilization, malignancy, inpatient, dvt in multiple veins --> grade 2C recommendation to start AC vs screening with US     Recommend:   -Continue 10mg Apixaban BID for 10 days (started on 01/16) followed by 5mg BID for three months  -No need for hypercoagulability workup given no family history and this is provoked DVT after surgery  - Needs close follow up with PCP to for continuation of therapy         Plan discussed with attending physician, medicine will continue to follow.  62y Female with recent diagnosis of left posterior mandible ameloblastoma, now s/p left mandibulectomy, neck exploration, implants, left FFF on 01/11/23. Medicine consulted for sinus tachycardia and new DVT in left lower extremity.    #Sinus tachycardia- resolving  -Low concern for infection/fever causing tachycardia given no WBC elevation, no new SOB or respiratory symptoms, no signs of surgical site infection, no systemic infection symptoms (malaise, nausea/vomiting/diarrhea)  -Sinus tachycardia could be multifactorial (pain from surgery, deconditioning after immobilization and surgery)  - CT angio negative for PE, patient saturating well on RA     # Acute LLE provoked DVT, below the knee  - US reveals LLE DVT in posterior tibial, anterior tibial and deep intramuscular calf   - provoked dvt in setting of recent surgery and immobilization (and malignancy)  - Below the knee DVT but high risk due to: immobilization, malignancy, inpatient, dvt in multiple veins --> grade 2C recommendation to start AC vs screening with US     Recommend:   -Continue 10mg Apixaban BID for 10 days (started on 01/16) followed by 5mg BID for three months  -No need for hypercoagulability workup given no family history and this is provoked DVT after surgery  - Needs close follow up with PCP to for continuation of therapy         Plan discussed with attending physician, medicine will continue to follow.

## 2023-01-18 NOTE — DISCHARGE NOTE PROVIDER - HOSPITAL COURSE
OTOLARYNGOLOGY (ENT) DISCHARGE SUMMARY    PATIENT: WALI HOFFMANN               : 64  MRN: [id number]  ADMISSION DATE: 23  Discharge Date: 23 @ 07:47  Attending Physician: Randy Ortiz    Admission Diagnosis:  D16.5        Secondary Diagnosis:    Status post:Mandibulectomy         Complications: None    Consultations: ***None    Chronic Conditions:  GERD (Gastroesophageal Reflux Disease) (ICD9 530.81)    Ameloblastoma of mandible        HPI:WALI HOFFMANN  is a 62y Female who suffers from the aforementioned conditions and was brought to the hospital today for surgery.  WALI HOFFMANN is a 62W with hx left post mandible ameloblastoma s/p left mandibulectomy, neck exploration, implants, left FFF on 23. Recent tachycardia to 110s-120s, over the weekend, patient found to have a DVT, started on eliquis.     Brief Hospital Course:    : patient POD 1, intraoral incisions intact, neck is soft and flat, SS drainage from TX, cook signal strong, elastics in place   : : patient seen bedside intraoral incision intact, neck soft and flat, continues to have drainage form penrose, cook signal strong, elastics in place,  30 cc SS fluid   : NAEON. examined at bedside. penrose removed yesterday. doppler signal strong. MOSES 35cc SS. Mild pitting edema of left ankle, no leg pain.  1/15: tachy overnight to 110-120s. No chest pain or difficulty breathing.  systolic. CBC and BMP wnl. Given 500cc LR bolus. Otherwise no complaints. Pain well controlled. Tolerating CLD.   : Patient was seen and examined on AM rounds. Patient continues to be tachycardic to 100s-110s. Doppler signal remains strong. Patient denies chest pain or difficulty breathing. CXR, EKG, troponins are all within normal limits. Pending lower extremity ultrasound today. Denies leg pain. MOSES 40cc.   : patient seen this morning, AVSS, doppler signal strong, flap soft and flat, MOSES drain removed form leg, NGT removed, continue eliquis        Disposition: Home with family.    Discharge Condition: Stable

## 2023-01-18 NOTE — DISCHARGE NOTE PROVIDER - CARE PROVIDER_API CALL
Malcom Jimenez; DDS)  HeadNeck Surgery; OralMaxillofacial Surgery; Otolaryngology  [facility] - Dept of Otolaryngology, [address], [place] [address]  Phone: [phone]  Fax: [phone]  Follow Up Time:

## 2023-01-18 NOTE — DISCHARGE NOTE PROVIDER - NSDCFUADDAPPT_GEN_ALL_CORE_FT
Please follow up outpatient with the medical team or your primary care to continue the management of Eliquis

## 2023-01-18 NOTE — PROGRESS NOTE ADULT - SUBJECTIVE AND OBJECTIVE BOX
OVERNIGHT EVENTS:  As per nurse and patient, no o/n events, patient resting comfortably.    INTERVAL HPI:   Patient was seen and examined at bedside. No complaints at this time. Patient denies: fever, chills, dizziness, weakness, HA, Changes in vision, CP, palpitations, SOB, cough, N/V/D/C, dysuria, changes in bowel movements, LE edema. ROS otherwise negative.    VITAL SIGNS:  T(F): 98.7 (01-18-23 @ 13:49)  HR: 98 (01-18-23 @ 12:23)  BP: 117/68 (01-18-23 @ 12:23)  RR: 18 (01-18-23 @ 12:23)  SpO2: 98% (01-18-23 @ 12:23)  Wt(kg): --    PHYSICAL EXAM:    Constitutional:, NAD  HEENT: PERRL, EOMI, sclera non-icteric, neck supple,   Respiratory: CTA b/l, good air entry b/l,   Cardiovascular: RRR, normal S1S2, no M/R/G  Gastrointestinal: soft, NTND, no masses palpable, BS normal  Extremities: Warm, well perfused, pulses equal bilateral upper and lower extremities, no edema,  Neurological: AAOx3, CN Grossly intact  Skin: Normal temperature, warm, dry    MEDICATIONS  (STANDING):  apixaban 10 milliGRAM(s) Oral every 12 hours  artificial tears (preservative free) Ophthalmic Solution 1 Drop(s) Both EYES two times a day  bacitracin   Ointment 1 Application(s) Topical every 12 hours  chlorhexidine 0.12% Liquid 15 milliLiter(s) Oral Mucosa two times a day  chlorhexidine 2% Cloths 1 Application(s) Topical daily  dextrose 5%. 1000 milliLiter(s) (100 mL/Hr) IV Continuous <Continuous>  dextrose 5%. 1000 milliLiter(s) (50 mL/Hr) IV Continuous <Continuous>  dextrose 50% Injectable 25 Gram(s) IV Push once  dextrose 50% Injectable 12.5 Gram(s) IV Push once  dextrose 50% Injectable 25 Gram(s) IV Push once  famotidine    Tablet 20 milliGRAM(s) Oral at bedtime  gabapentin Solution 300 milliGRAM(s) Oral two times a day  glucagon  Injectable 1 milliGRAM(s) IntraMuscular once  insulin lispro (ADMELOG) corrective regimen sliding scale   SubCutaneous every 6 hours  pantoprazole  Injectable 40 milliGRAM(s) IV Push daily  polyethylene glycol 3350 17 Gram(s) Oral daily  senna 2 Tablet(s) Oral at bedtime    MEDICATIONS  (PRN):  acetaminophen    Suspension .. 810 milliGRAM(s) Oral every 6 hours PRN Moderate Pain (4 - 6)  dextrose Oral Gel 15 Gram(s) Oral once PRN Blood Glucose LESS THAN 70 milliGRAM(s)/deciliter  oxyCODONE    Solution 5 milliGRAM(s) Oral every 6 hours PRN Moderate Pain (4 - 6)      Allergies    No Known Drug Allergies  Nuts (Anaphylaxis)    Intolerances        LABS:                        10.5   7.54  )-----------( 409      ( 18 Jan 2023 05:30 )             33.1     01-18    138  |  101  |  13  ----------------------------<  99  4.3   |  26  |  0.82    Ca    8.7      18 Jan 2023 05:30  Phos  4.2     01-18  Mg     2.2     01-18              RADIOLOGY & ADDITIONAL TESTS:  Reviewed

## 2023-01-18 NOTE — DISCHARGE NOTE PROVIDER - NSDCFUADDINST_GEN_ALL_CORE_FT
ENT Discharge Instructions    ENT follow up appointment:  - please call the office to confirm appointment: [phone]    *Please call your doctor or nurse practitioner if you have increased pain, swelling, redness, or drainage from the incision site.  *You may shower, and wash surgical incisions with a mild soap and warm water. Gently pat the area dry.  *If you have steri-strips, they will fall off on their own. Please remove any remaining strips 7-10 days after surgery.    Activity:  - fatigue is common after surgery, rest if you feel tired   -Please get plenty of rest, continue to ambulate several times per day, and drink adequate amounts of fluids.   -Avoid lifting weights greater than 5-10 lbs until you follow-up with your surgeon, who will instruct you further regarding activity restrictions.  -Avoid driving or operating heavy machinery while taking pain medications.  - Walking is recommended, ambulate as tolerated      Pain Expectations:  - pain after surgery is expected  - please take pain meds as prescribed     Medications: Please resume all regular home medications unless specifically advised not to take a particular medication. Also, please take any new medications as prescribed.   - pain medications can cause constipation, you should eat a high fiber diet and may take a stool softener while on pain meds   - Please take Eliquis 10 mg twice a day for 10 days and 5 mg twice a day for 3 months,     Warning Signs:  Please call your doctor or nurse practitioner if you experience the following:  *You experience new chest pain, pressure, squeezing or tightness.  *New or worsening cough, shortness of breath, or wheeze.  *If you are vomiting and cannot keep down fluids or your medications.  *You are getting dehydrated due to continued vomiting, diarrhea, or other reasons. Signs of dehydration include dry mouth, rapid heartbeat, or feeling dizzy or faint when standing.  *Your pain is not improving within 8-12 hours or is not gone within 24 hours.  *You have shaking chills, or fever greater than 101.5 degrees Fahrenheit or 38 degrees Celsius.  *Any change in your symptoms, or any new symptoms that concern you.     PLEASE CALL THE OFFICE WITH ANY QUESTIONS OR CONCERNS:

## 2023-01-24 DIAGNOSIS — I82.442 ACUTE EMBOLISM AND THROMBOSIS OF LEFT TIBIAL VEIN: ICD-10-CM

## 2023-01-24 DIAGNOSIS — D75.838 OTHER THROMBOCYTOSIS: ICD-10-CM

## 2023-01-24 DIAGNOSIS — I82.462 ACUTE EMBOLISM AND THROMBOSIS OF LEFT CALF MUSCULAR VEIN: ICD-10-CM

## 2023-01-24 DIAGNOSIS — K21.9 GASTRO-ESOPHAGEAL REFLUX DISEASE WITHOUT ESOPHAGITIS: ICD-10-CM

## 2023-01-24 DIAGNOSIS — D16.5 BENIGN NEOPLASM OF LOWER JAW BONE: ICD-10-CM

## 2023-01-26 LAB — SURGICAL PATHOLOGY STUDY: SIGNIFICANT CHANGE UP

## 2023-01-31 ENCOUNTER — APPOINTMENT (OUTPATIENT)
Dept: OTOLARYNGOLOGY | Facility: CLINIC | Age: 63
End: 2023-01-31
Payer: COMMERCIAL

## 2023-01-31 VITALS
HEART RATE: 95 BPM | WEIGHT: 118 LBS | TEMPERATURE: 98.4 F | SYSTOLIC BLOOD PRESSURE: 148 MMHG | BODY MASS INDEX: 23.16 KG/M2 | HEIGHT: 60 IN | DIASTOLIC BLOOD PRESSURE: 91 MMHG | OXYGEN SATURATION: 97 %

## 2023-01-31 PROCEDURE — 99024 POSTOP FOLLOW-UP VISIT: CPT

## 2023-01-31 NOTE — REASON FOR VISIT
[de-identified] : composite resection of left mandible with fibular flap reconstruction [de-identified] : 1/11/23 [de-identified] : 20 [de-identified] : 62 year old female with history of left posterior mandible ameloblastoma s/p composite resection of left mandible with fibular flap reconstruction. Patient overall doing well and pain is minimal at this point. She is tolerating soft diet and able to ambulate with boot/using walker. She does have some weakness of left big toe. Of note, she was discharged on eliquis for DVT in left calf and will be completing 3 month course per hospital recommendation.

## 2023-01-31 NOTE — ASSESSMENT
[FreeTextEntry1] : Doing well postoperatively donor site is healing nicely she is still wearing her boot her oral cavity is also healing some residual swelling in the left neck I removed her Doppler wire however overall everything appears to be healing quite nicely she feels quite well she is overall progressing nicely she is scheduled to see Dr. Ortiz this afternoon.  I will see her back in 2 months for a follow-up visit.  I did give her instructions regarding increasing her activity and the potential need for physical therapy depending on her mobility of her lower extremity.\par \par Malcom Jimenez DDS MD FACS\par Professor and Chair\par Department of Otolaryngology Head and Neck Surgery\par [facility]\par [facility]\par [facility]\par

## 2023-03-07 ENCOUNTER — APPOINTMENT (OUTPATIENT)
Dept: OTOLARYNGOLOGY | Facility: CLINIC | Age: 63
End: 2023-03-07
Payer: COMMERCIAL

## 2023-03-07 VITALS
TEMPERATURE: 98 F | BODY MASS INDEX: 15.37 KG/M2 | HEIGHT: 72 IN | SYSTOLIC BLOOD PRESSURE: 148 MMHG | HEART RATE: 79 BPM | DIASTOLIC BLOOD PRESSURE: 81 MMHG | WEIGHT: 113.44 LBS

## 2023-03-07 PROCEDURE — 99024 POSTOP FOLLOW-UP VISIT: CPT

## 2023-03-07 RX ORDER — APIXABAN 5 MG/1
5 TABLET, FILM COATED ORAL DAILY
Qty: 60 | Refills: 0 | Status: ACTIVE | COMMUNITY
Start: 2023-03-07 | End: 1900-01-01

## 2023-03-08 NOTE — PHYSICAL EXAM
[de-identified] : No palpable adenopathy [de-identified] : Good mouth opening and  occlusion [Midline] : trachea located in midline position [de-identified] : Left-sided swelling, improved since last visit still has some residual swelling overall the fibular flap is healing quite nicely some granulation tissue under the prosthesis but no issues noted in terms of active infection [Normal] : orientation to person, place, and time: normal [de-identified] : Left lower extremity donor site healed quite nicely without evidence of issues she is doing quite well from that standpoint [de-identified] : Normal cranial nerve III exam bilaterally

## 2023-03-08 NOTE — HISTORY OF PRESENT ILLNESS
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 Patient overall doing well and pain is minimal at this point. She is tolerating soft diet and able to ambulate with boot/using walker. She does have some weakness of left big toe. Of note, she was discharged on eliquis for DVT in left calf and will be completing 3 month course per hospital recommendation.  [FreeTextEntry1] : 3/7/2023:\par Patient is healing nicely and continues to do PT. She is ambulating and notes some limping but overall feels well. She is eating and drinking appropriately, she tries to chew mostly on right side. She is maintaining good oral hygiene. She is requesting refill for eliquis as she was told to take a 3 month course due to R DVT during post op course. Otherwise, patient has no complaints at this time and will follow up in 3 months.

## 2023-03-08 NOTE — REASON FOR VISIT
[Subsequent Evaluation] : a subsequent evaluation for [FreeTextEntry2] : s/p mandibular reconstruction with fibular flap

## 2023-04-19 ENCOUNTER — APPOINTMENT (OUTPATIENT)
Dept: INTERNAL MEDICINE | Facility: CLINIC | Age: 63
End: 2023-04-19
Payer: COMMERCIAL

## 2023-04-19 VITALS
SYSTOLIC BLOOD PRESSURE: 130 MMHG | DIASTOLIC BLOOD PRESSURE: 70 MMHG | HEIGHT: 72 IN | BODY MASS INDEX: 15.71 KG/M2 | HEART RATE: 90 BPM | OXYGEN SATURATION: 97 % | WEIGHT: 116 LBS

## 2023-04-19 DIAGNOSIS — J30.2 OTHER SEASONAL ALLERGIC RHINITIS: ICD-10-CM

## 2023-04-19 DIAGNOSIS — K59.00 CONSTIPATION, UNSPECIFIED: ICD-10-CM

## 2023-04-19 PROCEDURE — 99213 OFFICE O/P EST LOW 20 MIN: CPT

## 2023-04-19 NOTE — HISTORY OF PRESENT ILLNESS
[Other: _____] : [unfilled] [FreeTextEntry1] : Patient here to follow up. [de-identified] : Patient underwent surgery about 3 months ago she had left leg surgery in the past and also now she has a left-sided fascia  swelling\par  Pt feels better at the time of exam, no fever , shaking chills no nausea, no vomiting, no diarrhoea, no constipation, no chest pain, no palpitations.Denies any cough or any other respiratory s/s.Not loosing lot of weight nor gaining excess amount of weight.No muscle aches, no joint pains, no skin rashes.No forgetfulness no difficulty with ADLs, Able to sleep, eat ,function and eliminate well.Not been to an emergency room or hospital in the last one year.

## 2023-04-19 NOTE — PLAN
Anesthesia and MICU intensivist responded to CODE. [FreeTextEntry1] : Patient admits that she possibly had left leg DVT, and possible PE while she was in the hospital.  She admits that her blood pressure and pulse rate used to fluctuate when they attempted to ambulate her in the bed, after the surgery in the hospital.  She was sent home on Eliquis today will be the last dose after 90 days.  I suggested a hematological evaluation before continuing or discontinuing the medication Eliquis.  1 month supply of Eliquis prescribed to the patient because I do not want to abruptly discontinue the medication Eliquis.  Hematology evaluation suggested, patient understands the plan and agrees to follow it.\par \par Assurance given about the facial swelling that lymphatics were cut probably during the surgery, and gradually the swelling will improve.  And also related to the dental procedure the swelling might improve as the surgeon suggested.

## 2023-05-09 ENCOUNTER — OUTPATIENT (OUTPATIENT)
Dept: OUTPATIENT SERVICES | Facility: HOSPITAL | Age: 63
LOS: 1 days | Discharge: HOME | End: 2023-05-09

## 2023-05-09 DIAGNOSIS — I82.409 ACUTE EMBOLISM AND THROMBOSIS OF UNSPECIFIED DEEP VEINS OF UNSPECIFIED LOWER EXTREMITY: ICD-10-CM

## 2023-05-09 DIAGNOSIS — K06.9 DISORDER OF GINGIVA AND EDENTULOUS ALVEOLAR RIDGE, UNSPECIFIED: Chronic | ICD-10-CM

## 2023-05-09 DIAGNOSIS — Z87.42 PERSONAL HISTORY OF OTHER DISEASES OF THE FEMALE GENITAL TRACT: Chronic | ICD-10-CM

## 2023-05-17 RX ORDER — APIXABAN 5 MG/1
5 TABLET, FILM COATED ORAL DAILY
Qty: 180 | Refills: 0 | Status: ACTIVE | COMMUNITY
Start: 2023-01-31 | End: 1900-01-01

## 2023-05-19 ENCOUNTER — APPOINTMENT (OUTPATIENT)
Dept: HEMATOLOGY ONCOLOGY | Facility: CLINIC | Age: 63
End: 2023-05-19
Payer: COMMERCIAL

## 2023-05-19 VITALS
WEIGHT: 113 LBS | HEART RATE: 66 BPM | SYSTOLIC BLOOD PRESSURE: 145 MMHG | HEIGHT: 72 IN | BODY MASS INDEX: 15.31 KG/M2 | TEMPERATURE: 97 F | DIASTOLIC BLOOD PRESSURE: 83 MMHG | OXYGEN SATURATION: 98 %

## 2023-05-19 DIAGNOSIS — I82.442 ACUTE EMBOLISM AND THROMBOSIS OF LEFT TIBIAL VEIN: ICD-10-CM

## 2023-05-19 PROCEDURE — 99203 OFFICE O/P NEW LOW 30 MIN: CPT

## 2023-05-19 NOTE — HISTORY OF PRESENT ILLNESS
[de-identified] : Harini is referred by Dr. Hewitt for considerations for stopping DOAC\par \par Patient identification: Harini is a 63-year-old .\par Her past medical history is limited to hyperlipidemia and mandibular ameloblastoma.  \par \par HPI: Harini underwent a bone graft from her left leg for the ameloblastoma; this was complicated by DVT.  She has been on Eliquis since January 11, 2023.\par While on anticoagulation she has had some minor (early on) epistaxis.  \par She has not suffered peripheral edema nor Sx c/w a pulmonary embolism. \par \par Her plan was to complete 3 months of anticoagulation currently she is on her fourth month of anticoagulation.  \par \par Harini does not spontaneously report: fever, chills, sweats, weight loss, skin rashes, petechiae, bruising, eye irritation, hearing loss, mouth sores, sore throat, cough, hemoptysis, pleuritic chest pain, dyspnea, change in vision, focal numbness/weakness, chest pains, palpitations, nausea, vomiting, diarrhea, constipation, dysuria, hematuria, bowel or bladder incontinence, sever joint pain, back pain or gait disturbance.\par \par Examination:\par Harini is articulate and in no acute distress\par No occiput, poster cervical, anterior cervical, submandibular, sublingual, submental, supraclavicular nor axillary adenopathy\par No spinal, paraspinal, nor CA tenderness\par Lungs: Clear\par Cardiac: without rubs\par Abd: soft and non-tender, Traube's space is tympanitic\par No inguinal nor femoral adenopathy\par No sig peripheral edema\par Gait: unencumbered \par \par \par \par \par

## 2023-05-19 NOTE — ASSESSMENT
[FreeTextEntry1] : Assessment: 63 year old who has now completed 5 months of anticoagulation for a provoked DVT.\par \par \par Discussion: As Harini is asymptomatic and this was a provoked pulmonary embolism, I concur with discontinuing the Eliquis at this time.\par \par Plan:\par D/C eliquis\par Harini does not require hematology follow-up.\par \par Over 40 minutes were spent in direct patient care for this consultation with greater than 50% discussing her DVT risk.

## 2023-06-06 ENCOUNTER — APPOINTMENT (OUTPATIENT)
Dept: OTOLARYNGOLOGY | Facility: CLINIC | Age: 63
End: 2023-06-06
Payer: COMMERCIAL

## 2023-06-06 VITALS
DIASTOLIC BLOOD PRESSURE: 76 MMHG | OXYGEN SATURATION: 98 % | HEIGHT: 60 IN | BODY MASS INDEX: 22.19 KG/M2 | SYSTOLIC BLOOD PRESSURE: 134 MMHG | WEIGHT: 113 LBS | TEMPERATURE: 98.7 F | HEART RATE: 76 BPM

## 2023-06-06 PROCEDURE — 99213 OFFICE O/P EST LOW 20 MIN: CPT

## 2023-06-06 NOTE — PHYSICAL EXAM
[de-identified] : No palpable adenopathy [de-identified] : Good mouth opening and  occlusion [Midline] : trachea located in midline position [de-identified] : Left-sided swelling, improved since last visit some residual swelling overall the fibular flap is healing quite nicely.  Area under the prosthesis doing quite well markedly improved since last visit she has follow-up scheduled Dr. Washburn and Dr. Ortiz. [Normal] : orientation to person, place, and time: normal [de-identified] : Left lower extremity donor site healed quite nicely without evidence of issues she is doing quite well from that standpoint [de-identified] : Normal cranial nerve III exam bilaterally

## 2023-06-06 NOTE — HISTORY OF PRESENT ILLNESS
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 Patient overall doing well and pain is minimal at this point. She is tolerating soft diet and able to ambulate with boot/using walker. She does have some weakness of left big toe. Of note, she was discharged on eliquis for DVT in left calf and will be completing 3 month course per hospital recommendation. \par Interval History: 3/7/2023:\par Patient is healing nicely and continues to do PT. She is ambulating and notes some limping but overall feels well. She is eating and drinking appropriately, she tries to chew mostly on right side. She is maintaining good oral hygiene. She is requesting refill for eliquis as she was told to take a 3 month course due to R DVT during post op course. Otherwise, patient has no complaints at this time and will follow up in 3 months. \par \par 6/6/2023:\par Patient here today for 3 month follow up appointment. Of note she has discontinued eliquis per hem onc recommendation.

## 2023-12-05 ENCOUNTER — APPOINTMENT (OUTPATIENT)
Dept: OTOLARYNGOLOGY | Facility: CLINIC | Age: 63
End: 2023-12-05
Payer: COMMERCIAL

## 2023-12-05 VITALS
HEIGHT: 60 IN | SYSTOLIC BLOOD PRESSURE: 138 MMHG | BODY MASS INDEX: 22.19 KG/M2 | OXYGEN SATURATION: 99 % | DIASTOLIC BLOOD PRESSURE: 85 MMHG | WEIGHT: 113 LBS | HEART RATE: 75 BPM

## 2023-12-05 PROCEDURE — 99213 OFFICE O/P EST LOW 20 MIN: CPT

## 2024-06-10 PROBLEM — D16.5: Status: ACTIVE | Noted: 2022-09-27

## 2024-06-11 ENCOUNTER — APPOINTMENT (OUTPATIENT)
Dept: OTOLARYNGOLOGY | Facility: CLINIC | Age: 64
End: 2024-06-11
Payer: COMMERCIAL

## 2024-06-11 VITALS
DIASTOLIC BLOOD PRESSURE: 82 MMHG | OXYGEN SATURATION: 98 % | HEIGHT: 60 IN | SYSTOLIC BLOOD PRESSURE: 135 MMHG | TEMPERATURE: 97.8 F | HEART RATE: 71 BPM

## 2024-06-11 DIAGNOSIS — D16.5 BENIGN NEOPLASM OF LOWER JAW BONE: ICD-10-CM

## 2024-06-11 PROCEDURE — 99214 OFFICE O/P EST MOD 30 MIN: CPT

## 2024-06-16 NOTE — HISTORY OF PRESENT ILLNESS
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 Patient overall doing well and pain is minimal at this point. She is tolerating soft diet and able to ambulate with boot/using walker. She does have some weakness of left big toe. Of note, she was discharged on eliquis for DVT in left calf and will be completing 3 month course per hospital recommendation.  Interval History:  3/7/2023: Patient is healing nicely and continues to do PT. She is ambulating and notes some limping but overall feels well. She is eating and drinking appropriately, she tries to chew mostly on right side. She is maintaining good oral hygiene. She is requesting refill for eliquis as she was told to take a 3 month course due to R DVT during post op course. Otherwise, patient has no complaints at this time and will follow up in 3 months.   6/6/2023: Patient here today for 3 month follow up appointment. Of note she has discontinued eliquis per hem onc recommendation.  [FreeTextEntry1] : 12/5/2023: Patient here today for 6 month follow up. Continues to do well and denies any symptoms at this time. No new jaw or neck pain. She is eating and drinking normally and ambulating appropriately. Overall she has healed nicely, she feels overall well.  6/11/24: Pt here today for a 6 month follow up visit. She continues to heal well after her reconstruction with no new complaints today for a head and neck standpoint.  Overall doing very well. Prosthesis in place.

## 2024-06-16 NOTE — PHYSICAL EXAM
[Midline] : trachea located in midline position [Normal] : orientation to person, place, and time: normal [de-identified] : No palpable adenopathy [de-identified] : Good mouth opening and  occlusion [de-identified] : Left-sided swelling, improved since last visit some residual swelling overall the fibular flap is healing quite nicely.  Area under the prosthesis doing quite well markedly improved since last visit she has follow-up scheduled Dr. Washburn and Dr. Ortiz. [de-identified] : Slight asymmetry left lower lip, some soft tissue asymmetry [de-identified] : Left lower extremity donor site healed quite nicely without evidence of issues she is doing quite well from that standpoint [de-identified] : Normal cranial nerve III exam bilaterally

## 2024-06-16 NOTE — REASON FOR VISIT
[Subsequent Evaluation] : a subsequent evaluation for [FreeTextEntry2] : 6 month follow up, s/p mandible reconstruction with fibula free flap for ameloblastoma

## 2024-09-18 NOTE — PATIENT PROFILE ADULT - NSPROPTRIGHTCAREGIVER_GEN_A_NUR
Thank you for connecting with us today. If you have any questions after your visit, please feel free to contact us at [phone].    What to do in an Emergency:  If you are experiencing a medical emergency, call 911 immediately. Symptoms that require immediate attention require a visit at Urgent Care ([place]), Immediate Care Center ([place]) or the Emergency Room of a nearby hospital.    When to contact a provider:  Seek in-person treatment if there is no improvement of symptoms.     Do not have a primary care provider?   If you do not have a primary care provider or have any questions about your visit, please call the [facility] RN at [phone].    Billing Questions:   If you have any billing concerns, please contact our Billing Department at [phone]. Hours: Mon. - Thu. 7:30 am - 6 pm and Friday 7:30 pm to 5 pm.    Thank you for entrusting us with your care.     You can connect by Video with a [facility] provider 24/7 for common and non-urgent symptoms. You can also get care by completing an E-Visit questionnaire. Complete a questionnaire by choosing from a list of common health concerns*. A [facility] provider will respond to your questionnaire answers within an 1 hour (24/7). You will receive a treatment plan, including a prescription if you need one, and/or testing for COVID, Influenza, Mono, RSV, Strep and urine, depending on your symptoms.     Cold Symptoms Behavioral   Health Skin Concerns Women's and Men's   Health   Everything Else   Cough*    Anxiety* Acne                                 Birth Control Abdominal Discomfort     COVID treatment* Depression *  Bug Bites   Emergency Contraception Acid Reflux   Nasal congestion* Insomnia Cold Sores Erectile Dysfunction                      Excessive Sweating (underarms)          Red/pink eye  Dandruff Smoking Cessation    Headache or migraine*        Sore throat*  Eczema Urinary Symptoms* Joint pain or stiffness       Sinus infection*    Minor Skin  Injuries Vaginal Itching*  Medication Refills*         Poison Ivy Vaginal Discharge* Nausea, vomiting and diarrhea         Rash   Neck and Back Pain*       Shingles  Seasonal Allergies          Tick bites          no

## 2025-02-04 ENCOUNTER — APPOINTMENT (OUTPATIENT)
Dept: OTOLARYNGOLOGY | Facility: CLINIC | Age: 65
End: 2025-02-04
Payer: COMMERCIAL

## 2025-02-04 VITALS
DIASTOLIC BLOOD PRESSURE: 86 MMHG | SYSTOLIC BLOOD PRESSURE: 137 MMHG | HEART RATE: 69 BPM | OXYGEN SATURATION: 98 % | TEMPERATURE: 98 F

## 2025-02-04 PROCEDURE — 99214 OFFICE O/P EST MOD 30 MIN: CPT

## (undated) DEVICE — ELCTR BOVIE TIP BLADE INSULATED 2.75" EDGE

## (undated) DEVICE — ELCTR BOVIE PENCIL BLADE 10FT

## (undated) DEVICE — LONE STAR RETRACTOR RING 12MM BLUNT DISP

## (undated) DEVICE — BUR STRYKER CARBIDE ROUND 4MM

## (undated) DEVICE — BIPOLAR FORCEP KIRWAN JEWELERS STR 4" X 0.4MM W 12FT CORD (GREEN)

## (undated) DEVICE — SUT PROLENE 2-0 30" CT-2

## (undated) DEVICE — LONE STAR ELASTIC STAY HOOK 12MM BLUNT

## (undated) DEVICE — SYR LUER LOK 3CC

## (undated) DEVICE — CLAMP MICROVASCULAR SINGLE  1-2MM

## (undated) DEVICE — LIGASURE EXACT DISSECTOR

## (undated) DEVICE — SAW BLADE STRYKER PRECISION 9X0.51X31MM

## (undated) DEVICE — FOLEY TRAY 16FR 5CC LF UMETER CLOSED

## (undated) DEVICE — SAW BLADE STRYKER RECIPROCATING 22.5MMX0.38MM

## (undated) DEVICE — TUBING SUCTION 20FT

## (undated) DEVICE — DRAPE MAGNETIC INSTRUMENT MEDIUM

## (undated) DEVICE — SAW BLADE STRYKER RECIPROCATING 27MMX0.38MM

## (undated) DEVICE — SOL INJ NS 0.9% 100ML

## (undated) DEVICE — GLV 7.5 PROTEXIS (WHITE)

## (undated) DEVICE — SUT SILK 2-0 12-18"

## (undated) DEVICE — SAW BLADE STRYKER PRECISION THIN SAGITTAL MEDIUM 9MM X 25MM

## (undated) DEVICE — BUR STRYKER CARBIDE CROSS CUT FISSURE 1.2MM

## (undated) DEVICE — BUR STRYKER EGG 4MM

## (undated) DEVICE — PREP BETADINE SPONGE STICKS

## (undated) DEVICE — DOPPLER PROBE  CABLE

## (undated) DEVICE — DRAPE INSTRUMENT POUCH 6.75" X 11"

## (undated) DEVICE — CATARACT KIT

## (undated) DEVICE — URETERAL CATH RED RUBBER 10FR (BLACK)

## (undated) DEVICE — SYR LUER LOK 5CC

## (undated) DEVICE — LUBRICATING JELLY ONESHOT 1.25OZ

## (undated) DEVICE — STAPLER SKIN PROXIMATE

## (undated) DEVICE — PACK UPPER BODY

## (undated) DEVICE — ELCTR BOVIE PENCIL HANDPIECE ROCKER SWITCH 15FT

## (undated) DEVICE — SUT VICRYL 3-0 27" SH

## (undated) DEVICE — DRAPE VARI-LENS2 MICROSCPOPE 68MM

## (undated) DEVICE — DRSG GAUZE VASELINE PETROLEUM 3 X 9"

## (undated) DEVICE — MARKING PEN W RULER

## (undated) DEVICE — SUT PLAIN GUT FAST ABSORBING 5-0 PC-1

## (undated) DEVICE — SUT NYLON 8-0 5" DRM6

## (undated) DEVICE — NDL HYPO SAFE 22G X 1.5" (BLACK)

## (undated) DEVICE — STRYKER BONE MODEL CLEARVIEW MANDIBLE

## (undated) DEVICE — NDL COUNTER FOAM AND MAGNET 20-40

## (undated) DEVICE — DRAPE MAYO STAND 30"

## (undated) DEVICE — DRAIN PENROSE 5/8" X 18" LATEX

## (undated) DEVICE — SPONGE SURGICAL STRIP 1/2 X 6"

## (undated) DEVICE — BLADE SCALPEL SAFETYLOCK #15

## (undated) DEVICE — SUT SILK 3-0 18" TIES

## (undated) DEVICE — GEL AQUSNC PACKET 20GR

## (undated) DEVICE — BUR STRYKER CROSS CUT 1.6MM

## (undated) DEVICE — DRAIN SILICONE ROUND 9FR

## (undated) DEVICE — NDL HYPO SAFE 25G X 1.5" (ORANGE)

## (undated) DEVICE — VESSEL LOOP MAXI-BLUE 0.120" X 16"

## (undated) DEVICE — BIPOLAR FORCEP SYMMETRY BAYONET STR 8.25" X 1.5MM (BLUE)

## (undated) DEVICE — DRAPE SPLIT SHEET 77" X 108"

## (undated) DEVICE — SHEARS HARMONIC FOCUS CURVED SHEARS 9CM

## (undated) DEVICE — WARMING BLANKET UPPER ADULT

## (undated) DEVICE — SPECIMEN CONTAINER 4OZ

## (undated) DEVICE — SPONGE PEANUT AUTO COUNT

## (undated) DEVICE — SPEAR SURG EYE WECK-CELL CELOS

## (undated) DEVICE — VENODYNE/SCD SLEEVE CALF MEDIUM

## (undated) DEVICE — SYR LUER LOK 50CC

## (undated) DEVICE — DRAPE TOWEL BLUE 17" X 24"

## (undated) DEVICE — CANNULA ANT CHMBR 27GX22MM

## (undated) DEVICE — GLV 6.5 PROTEXIS (WHITE)

## (undated) DEVICE — SUT MONOCRYL 4-0 27" PS-2 UNDYED

## (undated) DEVICE — SYR CONTROL LUER LOK 10CC

## (undated) DEVICE — STRYKER BONE MODEL CLEARVIEW MANDIBLE AND MAXILLA

## (undated) DEVICE — SUT SILK 2-0 30" PSL